# Patient Record
Sex: FEMALE | Race: WHITE | NOT HISPANIC OR LATINO | Employment: FULL TIME | URBAN - METROPOLITAN AREA
[De-identification: names, ages, dates, MRNs, and addresses within clinical notes are randomized per-mention and may not be internally consistent; named-entity substitution may affect disease eponyms.]

---

## 2023-10-31 ENCOUNTER — TELEMEDICINE (OUTPATIENT)
Dept: ENDOCRINOLOGY | Facility: CLINIC | Age: 38
End: 2023-10-31
Payer: COMMERCIAL

## 2023-10-31 ENCOUNTER — DOCUMENTATION (OUTPATIENT)
Dept: ENDOCRINOLOGY | Facility: CLINIC | Age: 38
End: 2023-10-31

## 2023-10-31 DIAGNOSIS — Z01.83 ENCOUNTER FOR RH BLOOD TYPING: ICD-10-CM

## 2023-10-31 DIAGNOSIS — N97.9 FEMALE INFERTILITY: ICD-10-CM

## 2023-10-31 DIAGNOSIS — Z31.41 FERTILITY TESTING: ICD-10-CM

## 2023-10-31 DIAGNOSIS — Z01.812 ENCOUNTER FOR PREPROCEDURAL LABORATORY EXAMINATION: ICD-10-CM

## 2023-10-31 DIAGNOSIS — Z11.3 SCREENING FOR STDS (SEXUALLY TRANSMITTED DISEASES): ICD-10-CM

## 2023-10-31 DIAGNOSIS — Z11.59 ENCOUNTER FOR SCREENING FOR OTHER VIRAL DISEASES: ICD-10-CM

## 2023-10-31 DIAGNOSIS — Z13.29 SCREENING FOR THYROID DISORDER: Primary | ICD-10-CM

## 2023-10-31 DIAGNOSIS — N96 RECURRENT PREGNANCY LOSS WITHOUT CURRENT PREGNANCY: ICD-10-CM

## 2023-10-31 PROCEDURE — 99213 OFFICE O/P EST LOW 20 MIN: CPT | Performed by: NURSE PRACTITIONER

## 2023-10-31 ASSESSMENT — PATIENT HEALTH QUESTIONNAIRE - PHQ9
SUM OF ALL RESPONSES TO PHQ9 QUESTIONS 1 AND 2: 0
2. FEELING DOWN, DEPRESSED OR HOPELESS: NOT AT ALL
1. LITTLE INTEREST OR PLEASURE IN DOING THINGS: NOT AT ALL

## 2023-10-31 ASSESSMENT — ENCOUNTER SYMPTOMS
LOSS OF SENSATION IN FEET: 0
DEPRESSION: 0
OCCASIONAL FEELINGS OF UNSTEADINESS: 0

## 2023-10-31 ASSESSMENT — PAIN SCALES - GENERAL: PAINLEVEL: 0-NO PAIN

## 2023-10-31 NOTE — PROGRESS NOTES
Treatment to date reviewed:    IVF #1: 2021: OCP Antagonist /Menopur 75--> Peak E2 1911--> Lupron trigger, 16 eggs, 7 mature--> ICSI--. 3 blasts--> 1 Day 5 4bb Transferred--> negative  2 blasts frozen (Day 5 3BBx2)    FET #1: 2021- Programmed FET with oral estrace and vaginal added x 2 days, EE 9.1 TL--> 1 blast transferred, hCG=9 (biochemical)    Mock : 2021- Programmed FET with oral estrace and Vaginal estrace (from beginning)--> 8.1 TL, 75 mg IM P4--> Biopsy  Receptive  BCL6+  Small number  cells, treated with doxy      Did endo suppression with Lupron, Letrozole, Aygestin  FET #3: 2021- Programmed FET with oral estrace and vaginal estrace, EE 8.4 mm TL--> 1 blast--> negative      IVF #2: 2021- OCP Antagonist /Menopur 75--> Peak E2 --> Co-trigger, 14 eggs retrieved, 12 mature-> ICSI-->10 2pn--. 5 blasts frozen (4 Day 5- 4AA, 4ABx2, 3AB, 1 day 6 3BB)    RPL labs done-  +LAC x1    FET #4: 2021 Letrozole FET 5 mg CD3-7--> HCG trigger, EE 7.2 TL--> Prometrium 200 mg vaginally BID--> increased to TID  Lovenox +baby ASA--> Successful SIUP with full term  and GHTN    Plan for next cycle-  Repeat APLAS testing  Plan FET with letrozole 5 mg CD3-7 with hCG trigger and Prometrium vaginally 200 mg TID +Lovenox and Baby ASA to start with transfer. Transfer 1 blast.    If no success with this protocol can consider suppression therapy in the future transfers if we want to try programmed FET again.    Shante Lozano MD

## 2023-10-31 NOTE — PROGRESS NOTES
Virtual Visit     FERTILITY PATIENT VISIT    Accompanied today by: patient      Lilly Arshad is a 37 y.o.  female who presents with wanting to do an FET.     Has 4 embryos here.    Had 3 failed transfers. She then did BCL6 testing and . Positive for both. Also was receptive.   Did suppression protocol with Lupron, aygestin and letrozole.    PRIOR EVALUATION / TREATMENT  Previously did IVF.    3 Failed transfers (1- biochemical) and 4th had a pregnancy.    Conceived with a stimulated FET with letrozole 5mg and vaginal progesterone suppositories. Lovenox 40mg subcutaneous daily and baby aspirin started with transfer due to elevated LAC.    She also did a suppression protocol prior, but did have a month off to re-stimulate.     Hysterosalpingogram: N/A  Saline Infused Sonography: N/A  GYN Pelvic Ultrasound: N/A  Other:  none    Prior Labs  Lab Results    Date Done      AMH: No results found for requested labs within last 365 days. No results found for requested labs within last 365 days.   TSH: No results found for requested labs within last 365 days. No results found for requested labs within last 365 days.   PRL: No results found for requested labs within last 365 days. No results found for requested labs within last 365 days.   Testosterone: No results found for requested labs within last 365 days. No results found for requested labs within last 365 days.   DHEAS: No results found for requested labs within last 365 days. No results found for requested labs within last 365 days.   FSH: No results found for requested labs within last 365 days. No results found for requested labs within last 365 days.   17 OHP: No results found for requested labs within last 365 days. No results found for requested labs within last 365 days.   HgbA1c: No results found for requested labs within last 365 days. No results found for requested labs within last 365 days.   Hepatitis B surface antigen: No results found for  requested labs within last 365 days. No results found for requested labs within last 365 days.   Hepatitis C antibody: No results found for requested labs within last 365 days. No results found for requested labs within last 365 days.   HIV ½ Antigen Antibody screen with reflex: No results found for requested labs within last 365 days. No results found for requested labs within last 365 days.   Syphilis screening with reflex: No results found for requested labs within last 365 days. No results found for requested labs within last 365 days.   GC: No results found for requested labs within last 365 days. No results found for requested labs within last 365 days.   CT: No results found for requested labs within last 365 days. No results found for requested labs within last 365 days.   Type and Screen: No results found for requested labs within last 365 days. No results found for requested labs within last 365 days.   Rh: No results found for requested labs within last 365 days. No results found for requested labs within last 365 days.   Antibody: No results found for requested labs within last 365 days. No results found for requested labs within last 365 days.   Rubella: No results found for requested labs within last 365 days. 1/26/2022   Varicella: No results found for requested labs within last 365 days. 7/1/2019   Hemoglobin: No results found for requested labs within last 365 days. No results found for requested labs within last 365 days.   Hematocrit: No results found for requested labs within last 365 days. No results found for requested labs within last 365 days.   Creatinine: No results found for requested labs within last 365 days. No results found for requested labs within last 365 days.   AST:No results found for requested labs within last 365 days. No results found for requested labs within last 365 days.   ALT:No results found for requested labs within last 365 days.: No results found for requested labs  within last 365 days.      Relationship Status:      OB Hx     OB History          1    Para   1    Term   1       0    AB   0    Living   1         SAB   0    IAB   0    Ectopic   0    Multiple   0    Live Births   1                 MENSTRUAL HISTORY:   Cycle length:  Q 28-30 days  Bleeding length: 5 days   Flow :  Average    Dysmenorrhea: No  She has stopped breastfeeding.  Pap - normal       PMH  Past Medical History:   Diagnosis Date    Encounter for pregnancy test, result unknown 05/10/2021    Pregnancy examination or test, pregnancy unconfirmed    Inappropriate change in quantitative human chorionic gonadotropin (hCG) in early pregnancy 05/10/2021    Inappropriate change in quantitative hCG in early pregnancy    Other specified health status 2019    No pertinent past medical history        MEDICATIONS  No current outpatient medications on file prior to visit.     No current facility-administered medications on file prior to visit.       PS  Past Surgical History:   Procedure Laterality Date    ANTERIOR CRUCIATE LIGAMENT REPAIR  2015    Primary Repair Of Knee Ligament Cruciate Anterior    OTHER SURGICAL HISTORY  2022    In vitro fertilization        Social History     Tobacco Use    Smoking status: Former     Types: Cigarettes    Smokeless tobacco: Never   Substance Use Topics    Alcohol use: Yes    Drug use: Never        BMI:   BMI Readings from Last 1 Encounters:   22 24.61 kg/m²     VITALS:  LMP 10/08/2023   LMP: Patient's last menstrual period was 10/08/2023.    ASSESSMENT   37 y.o.  female with  secondary unexplained infertility x 1 year. Previously did IVF with us and has 4 frozen blast here. Wants to do an FET.    COUNSELING  We discussed causes of infertility including hormonal, egg quality issues, structural problems such as endometriosis, adhesions, or tubal problems, uterine factors such as polyps or fibroids, and sperm issues. Reviewed  evaluation of such as well. We discussed various methods for achieving pregnancy in some detail including, ovulation induction, insemination, superovulation and IVF.    FET NP Teaching Performed  Reviewed plan for upcoming FET.   Reviewed lining check. Reviewed estrogen priming and progesterone.   Reviewed need for ultrasound monitoring. Reviewed cancelled cycles, sometimes multiple to achieve optimal lining.   Reviewed possibility that embryos might not survive thaw and need for signed thaw plan.   All questions answered.   Reviewed risks for stimulated FET's to be cancelled due to falling on the weekend.  Plan for Letrozole 5mg stimulated FET with vaginal progesterone. Conceived on this. Will also plan on Lovenox 40mg subcutaneous and baby aspirin 81mg po with transfer.    [X ] Hysteroscopy vs SIS for cavity evaluation  Labs: STD's, T&S, Rubella, Varicella, TSH, LAC  We have agree to transfer: 1 embryos    Kenya Oconnell LAUREN        Routine Testing  Fertility Center  STDs Within 1 year   Genetic carrier Waiver/Completed   T&S Within 1 year   AMH Within 1 year   TSH Within 1 year   Rubella/Varicella Within 5 years     BMI Testing  Fertility Center  CBC Within 1 year   CMP Within 1 year   HgbA1c Within 1 year   Mag, Phos, Vit D <18 Within 1 year   MFM > 40  REQ   Wt loss consult > 40 OPT     PLAN  Orders Placed This Encounter   Procedures    Hysteroscopy diagnostic    TSH with reflex to Free T4 if abnormal    Type And Screen    Rubella Antibody, Igg    Varicella Zoster Antibody, Igg    Hepatitis B surface antigen    Hepatitis C Antibody    HIV-1 and HIV-2 antibodies    Syphilis Screen with Reflex    C. Trachomatis / N. Gonorrhoeae, Amplified Detection    Lupus Anticoag. With Interpretation[Painter]    POCT pregnancy, urine manually resulted       FOLLOW UP   Hysteroscopy- call cd 1 to schedule between cycle day 6-11.  Labs: can be done at any  lab.  Will touch base with Dr. Lozano about another suppression protocol  prior to transfer. Previously did Lupron, Aygestin and Letrozole. Will message patient back on that.  Will send a message to RN after hearing back to get on the stimulated FET calendar.    Kenya Oconnell  10/31/2023  10:20 AM

## 2023-11-14 ENCOUNTER — HOSPITAL ENCOUNTER (OUTPATIENT)
Dept: ENDOCRINOLOGY | Facility: CLINIC | Age: 38
Discharge: HOME | End: 2023-11-14
Payer: COMMERCIAL

## 2023-11-14 ENCOUNTER — LAB (OUTPATIENT)
Dept: LAB | Facility: LAB | Age: 38
End: 2023-11-14
Payer: COMMERCIAL

## 2023-11-14 ENCOUNTER — PREP FOR PROCEDURE (OUTPATIENT)
Dept: ENDOCRINOLOGY | Facility: CLINIC | Age: 38
End: 2023-11-14

## 2023-11-14 VITALS
BODY MASS INDEX: 26.75 KG/M2 | WEIGHT: 166.45 LBS | RESPIRATION RATE: 17 BRPM | HEIGHT: 66 IN | DIASTOLIC BLOOD PRESSURE: 85 MMHG | OXYGEN SATURATION: 98 % | HEART RATE: 72 BPM | SYSTOLIC BLOOD PRESSURE: 130 MMHG | TEMPERATURE: 97.9 F

## 2023-11-14 DIAGNOSIS — N84.0 ENDOMETRIAL POLYP: Primary | ICD-10-CM

## 2023-11-14 DIAGNOSIS — Z11.3 SCREENING FOR STDS (SEXUALLY TRANSMITTED DISEASES): ICD-10-CM

## 2023-11-14 DIAGNOSIS — Z11.59 ENCOUNTER FOR SCREENING FOR OTHER VIRAL DISEASES: ICD-10-CM

## 2023-11-14 DIAGNOSIS — Z13.29 SCREENING FOR THYROID DISORDER: ICD-10-CM

## 2023-11-14 DIAGNOSIS — Z01.812 ENCOUNTER FOR PREPROCEDURAL LABORATORY EXAMINATION: ICD-10-CM

## 2023-11-14 DIAGNOSIS — N96 RECURRENT PREGNANCY LOSS WITHOUT CURRENT PREGNANCY: ICD-10-CM

## 2023-11-14 DIAGNOSIS — Z31.41 FERTILITY TESTING: ICD-10-CM

## 2023-11-14 DIAGNOSIS — Z01.83 ENCOUNTER FOR RH BLOOD TYPING: ICD-10-CM

## 2023-11-14 LAB
ABO GROUP (TYPE) IN BLOOD: NORMAL
ANTIBODY SCREEN: NORMAL
HBV SURFACE AG SERPL QL IA: NONREACTIVE
PREGNANCY TEST URINE, POC: NEGATIVE
RH FACTOR (ANTIGEN D): NORMAL
TSH SERPL-ACNC: 1.37 MIU/L (ref 0.44–3.98)

## 2023-11-14 PROCEDURE — 87340 HEPATITIS B SURFACE AG IA: CPT

## 2023-11-14 PROCEDURE — 86780 TREPONEMA PALLIDUM: CPT

## 2023-11-14 PROCEDURE — 86900 BLOOD TYPING SEROLOGIC ABO: CPT

## 2023-11-14 PROCEDURE — 94760 N-INVAS EAR/PLS OXIMETRY 1: CPT

## 2023-11-14 PROCEDURE — 86787 VARICELLA-ZOSTER ANTIBODY: CPT

## 2023-11-14 PROCEDURE — 36415 COLL VENOUS BLD VENIPUNCTURE: CPT

## 2023-11-14 PROCEDURE — 86901 BLOOD TYPING SEROLOGIC RH(D): CPT

## 2023-11-14 PROCEDURE — 86803 HEPATITIS C AB TEST: CPT

## 2023-11-14 PROCEDURE — 86850 RBC ANTIBODY SCREEN: CPT

## 2023-11-14 PROCEDURE — 87800 DETECT AGNT MULT DNA DIREC: CPT

## 2023-11-14 PROCEDURE — 58555 HYSTEROSCOPY DX SEP PROC: CPT | Performed by: OBSTETRICS & GYNECOLOGY

## 2023-11-14 PROCEDURE — 86317 IMMUNOASSAY INFECTIOUS AGENT: CPT

## 2023-11-14 PROCEDURE — 87389 HIV-1 AG W/HIV-1&-2 AB AG IA: CPT

## 2023-11-14 PROCEDURE — 84443 ASSAY THYROID STIM HORMONE: CPT

## 2023-11-14 RX ORDER — KETOROLAC TROMETHAMINE 30 MG/ML
30 INJECTION, SOLUTION INTRAMUSCULAR; INTRAVENOUS ONCE AS NEEDED
Status: CANCELLED | OUTPATIENT
Start: 2023-11-14 | End: 2023-11-19

## 2023-11-14 RX ORDER — ACETAMINOPHEN 325 MG/1
650 TABLET ORAL ONCE AS NEEDED
Status: DISCONTINUED | OUTPATIENT
Start: 2023-11-14 | End: 2023-11-15 | Stop reason: HOSPADM

## 2023-11-14 RX ORDER — KETOROLAC TROMETHAMINE 30 MG/ML
30 INJECTION, SOLUTION INTRAMUSCULAR; INTRAVENOUS ONCE AS NEEDED
Status: DISCONTINUED | OUTPATIENT
Start: 2023-11-14 | End: 2023-11-15 | Stop reason: HOSPADM

## 2023-11-14 RX ORDER — ACETAMINOPHEN 325 MG/1
650 TABLET ORAL ONCE AS NEEDED
Status: CANCELLED | OUTPATIENT
Start: 2023-11-14

## 2023-11-14 ASSESSMENT — PAIN - FUNCTIONAL ASSESSMENT
PAIN_FUNCTIONAL_ASSESSMENT: 0-10
PAIN_FUNCTIONAL_ASSESSMENT: 0-10

## 2023-11-14 ASSESSMENT — PAIN SCALES - GENERAL: PAINLEVEL_OUTOF10: 0 - NO PAIN

## 2023-11-14 NOTE — PROGRESS NOTES
Patient ID: Lilly Arshad is a 37 y.o. female.    Hysteroscopy diagnostic    Date/Time: 11/14/2023 2:57 PM    Performed by: Shante Lozano MD  Authorized by: JARET Stubbs    Consent:     Consent obtained:  Verbal and written    Consent given by:  Patient    Risks, benefits, and alternatives were discussed: yes      Risks discussed:  Bleeding, infection and pain  Universal protocol:     Procedure explained and questions answered to patient or proxy's satisfaction: yes      Relevant documents present and verified: yes      Test results available: yes      Imaging studies available: yes      Required blood products, implants, devices, and special equipment available: yes      Immediately prior to procedure, a time out was called: yes      Patient identity confirmed:  Verbally with patient, arm band and hospital-assigned identification number  Pre-procedure details:     Skin preparation:  Povidone-iodine  Anesthesia:     Anesthesia method:  Local infiltration    Local anesthetic:  Lidocaine 1% w/o epi  Procedure specific details:      Procedure: Diagnostic Hysteroscopy   Preop diagnosis: Fertility testing  Post op diagnosis: Endometrial polyp   Assistant: None    Anesthesia: None   IV: None   EBL: 3 cc  Specimens: None   Complications: None   Risks benefits and alternatives of the procedure explained to the patient and informed consent was obtained. Urine pregnancy test was performed and was negative. Time out was performed. The patient was placed in the dorsal lithotomy position and a sterile speculum was placed in the vagina. The cervix was sterilized with Betadine x3. Paracervical block with lidocaine 1% was administered.   Tenaculum: No  Dilation: No  The hysteroscope was placed in the cervix and advanced into the uterine cavity. Normal saline was used for distension media. Images were obtained and findings noted as below.   All instruments were then removed. Good hemostasis was noted. Patient  tolerated the procedure well returned to the recovery area in stable condition. .   Findings:   Cavity: Small endometrial polyp noted on right posterior wall  Ostia: Bilateral tubal ostia visualized  Additional Notes: Cavity otherwise normal  Will recommend operative hysteroscopy/polypectomy            Post-procedure details:     Procedure completion:  Tolerated well, no immediate complications

## 2023-11-15 ENCOUNTER — APPOINTMENT (OUTPATIENT)
Dept: ENDOCRINOLOGY | Facility: CLINIC | Age: 38
End: 2023-11-15
Payer: COMMERCIAL

## 2023-11-15 ENCOUNTER — TELEPHONE (OUTPATIENT)
Dept: ENDOCRINOLOGY | Facility: CLINIC | Age: 38
End: 2023-11-15
Payer: COMMERCIAL

## 2023-11-15 DIAGNOSIS — Z31.83 ENCOUNTER FOR ASSISTED REPRODUCTIVE FERTILITY CYCLE: ICD-10-CM

## 2023-11-15 LAB
C TRACH RRNA SPEC QL NAA+PROBE: NEGATIVE
HCV AB SER QL: NONREACTIVE
HIV 1+2 AB+HIV1 P24 AG SERPL QL IA: NONREACTIVE
N GONORRHOEA DNA SPEC QL PROBE+SIG AMP: NEGATIVE
RUBV IGG SERPL IA-ACNC: 0.8 IA
RUBV IGG SERPL QL IA: NORMAL
T PALLIDUM AB SER QL: NONREACTIVE
VARICELLA ZOSTER IGG INDEX: 0.9 IA
VZV IGG SER QL IA: ABNORMAL

## 2023-11-15 NOTE — TELEPHONE ENCOUNTER
Talked with patient and confirmed her hysteroscopic polypectomy without anesthesia on Friday 11/17/23 at 1430. Patient asked to arrive at 1415. Patient agreeable and all questions answered. Trisha Cunningham RN 11/15/23 3:21 PM

## 2023-11-16 ENCOUNTER — TELEPHONE (OUTPATIENT)
Dept: ENDOCRINOLOGY | Facility: CLINIC | Age: 38
End: 2023-11-16
Payer: COMMERCIAL

## 2023-11-16 DIAGNOSIS — N97.9 FEMALE INFERTILITY: ICD-10-CM

## 2023-11-16 DIAGNOSIS — N96 RECURRENT PREGNANCY LOSS WITHOUT CURRENT PREGNANCY: Primary | ICD-10-CM

## 2023-11-16 DIAGNOSIS — Z31.83 ENCOUNTER FOR ASSISTED REPRODUCTIVE FERTILITY CYCLE: ICD-10-CM

## 2023-11-16 NOTE — TELEPHONE ENCOUNTER
"Lab services called and said the lupus anti coagulant was cancelled. Reason stated was \"improperly processed.\" Labs were drawn 11/14.  "

## 2023-11-16 NOTE — PROGRESS NOTES
Bo[facility] Interval FET Checklist    Age: 38 y.o.    Provider: LAUREN Melendez MD  Primary RN: Kathia Moeller  Reasons for Treatment: secondary unexplained infertility x 1 year. Previously did IVF with us and has 4 frozen blast here. Wants to do an FET   Last BMI  23 : 26.87 kg/m²       Past Medical History:   Diagnosis Date    Encounter for pregnancy test, result unknown 05/10/2021    Pregnancy examination or test, pregnancy unconfirmed    Endometriosis     Inappropriate change in quantitative human chorionic gonadotropin (hCG) in early pregnancy 05/10/2021    Inappropriate change in quantitative hCG in early pregnancy    Other specified health status 2019    No pertinent past medical history     Ectopic Risk: N    Date Done Consultation Results/Comments   10/31/23 Medication Protocol Plan for next cycle-  Plan FET with letrozole 5 mg CD3-7 with hCG trigger and Prometrium vaginally 200 mg TID. Transfer 1 blast.  No lovenox needed based on recent LAC results which were WNL- RF.    If no success with this protocol can consider suppression therapy in the future transfers if we want to try programmed FET again.    24 FET Treatment Form Enroll in EngagedMD: Yes (Kathia Moeller RN)  Received and in chart: Yes (Kathia Moeller RN)   24 IVF Consent Form Enroll in EngagedMD: Yes (Kathia Moelelr RN)  Received and in chart: Yes (Kathia Moeller RN)   N/A  Amina (in) Form Enroll in EngagedMD: N/A  Received and in chart: N/A   N/A [employer] Transfer Authorization Form Enroll in EngagedMD: N/A  Received and in chart: N/A   N/A Embryo Ship In N/A   23 Procedure Order Placed Yes    N/A Genetic Carrier Screening Clearance N/A   N/A MFM Consult Okay to proceed? N/A   N/A Psych Consult Okay to proceed? N/A   N/A Genetics Consult Okay to proceed? N/A    Other    Date Done Female Labs Results/Comments   2023 T&S (Q 1 Year) ABO: A  Rh: NEG  Antibody:     11/14/2023 Hep B sAg Nonreactive   11/14/2023 Hep C AB Nonreactive   11/14/2023 HIV Nonreactive   11/14/2023 Syphilis Nonreactive   11/14/2023 GC/CT GC: Negative  CT: Negative   11/14/2023 Rubella (Q 5 Years) Equivocal- second vaccine on 1/11/24 11/14/2023 Varicella (Q 5 Years) Equivocal- second vaccine on 12/15/23   11/14/2023 TSH 1.37 (Ref range: 0.44 - 3.98 mIU/L)   N/A HgbA1C N/A   11/14/23 11/17/23 Uterine Cavity Eval HSG: N/A  SIS: N/A  Hyster: (11/14/2023) Procedure: Diagnostic Hysteroscopy   Preop diagnosis: Fertility testing  Post op diagnosis: Endometrial polyp   Assistant: None    Anesthesia: None   IV: None   EBL: 3 cc  Specimens: None   Complications: None   Risks benefits and alternatives of the procedure explained to the patient and informed consent was obtained. Urine pregnancy test was performed and was negative. Time out was performed. The patient was placed in the dorsal lithotomy position and a sterile speculum was placed in the vagina. The cervix was sterilized with Betadine x3. Paracervical block with lidocaine 1% was administered.   Tenaculum: No  Dilation: No  The hysteroscope was placed in the cervix and advanced into the uterine cavity. Normal saline was used for distension media. Images were obtained and findings noted as below.   All instruments were then removed. Good hemostasis was noted. Patient tolerated the procedure well returned to the recovery area in stable condition. .   Findings:   Cavity: Small endometrial polyp noted on right posterior wall  Ostia: Bilateral tubal ostia visualized  Additional Notes: Cavity otherwise normal  Will recommend operative hysteroscopy/polypectomy          Findings: endometrial polyp vs very small fibroid noted on posterior wall and removed.  Cavity otherwise normal with ostia visualized bilaterally.      PROCEDURE DETAILS:   Patient was taken to the operating  room.  She was prepped and draped in the usual sterile fashion in dorsal lithotomy position. A speculum was placed within the vaginal canal and the cervix was visualized. Betadine swabs x 3 were used to cleanse the cervix. A single tooth tenaculum was placed on the anterior lip of the cervix. Lidocaine 1% was injected paracervically (8 ml). The Aveta hysteroscope was then inserted into the uterine cavity under direct visualization using normal saline as a distension medium. Once entry into the uterine cavity was completed, the above findings were visualized. The resection device was used to the resect the tissue until the uterus appeared smooth with no residual pathology.   Additional procedure details: None     Excellent hemostasis was then noted. The hysteroscope was then removed from the uterine cavity without complications. Tenaculum and speculum were removed. Patient tolerated the procedure well and was returned to the recovery room in stable condition.  All counts were correct x 2.    1/26/2022 Pap Smear (Q 5 Years) Lab Results   Component Value Date    CVTFINALDX  01/26/2022     A.  THINPREP PAP CERVICAL:              Specimen adequacy:       SATISFACTORY FOR EVALUATION.       Quality Indicator: Endocervical/transformation zone component is present.       Quality Indicator: Partially obscured by cytolysis.              General Categorization:       NEGATIVE FOR INTRAEPITHELIAL LESION OR MALIGNANCY.                            HIGH RISK HPV TEST RESULT:          NEGATIVE              Reference Range: Negative                     HPV: Negative    N/A Mammogram ( > 40) (Q 1 Year) N/A               Date Done Miscellaneous Results/Comments   N/A BMI Checklist  BMI > 40 or < 18 Added to chart:   No   N/A >= 45 Checklist  Added to chart:   No   **Does not need to be completed prior to placing on IVF calendar**    No lovenox needed based on recent LAC results.   Reviewed and approved by ROSANNA RANDHAWA on 2/12/24 at  4:02 PM.

## 2023-11-16 NOTE — TELEPHONE ENCOUNTER
Spoke with patient regarding lupus anti coagulant level. Patient instructed d/t level being improperly processed by the lab patient will need to have level redrawn. Patient isntructed she may go to a  lab close to home to have level drawn. Patient instructed to call this RN after she has second varicella booster to ensure checklist items have been completed. Patient instructed that this RN will enroll patient in engaged MD and assign required forms. Patient instructed this RN will message financial team regarding PA for [facility]. Patient agreeable. Patient will call after second booster for next steps.   GABBY EUCEDA on 11/16/23 at 10:21 AM.

## 2023-11-17 ENCOUNTER — LAB (OUTPATIENT)
Dept: LAB | Facility: LAB | Age: 38
End: 2023-11-17
Payer: COMMERCIAL

## 2023-11-17 ENCOUNTER — HOSPITAL ENCOUNTER (OUTPATIENT)
Dept: ENDOCRINOLOGY | Facility: CLINIC | Age: 38
Discharge: HOME | End: 2023-11-17
Payer: COMMERCIAL

## 2023-11-17 VITALS
RESPIRATION RATE: 16 BRPM | DIASTOLIC BLOOD PRESSURE: 89 MMHG | TEMPERATURE: 97.3 F | WEIGHT: 165.79 LBS | HEART RATE: 70 BPM | SYSTOLIC BLOOD PRESSURE: 132 MMHG | BODY MASS INDEX: 26.64 KG/M2 | HEIGHT: 66 IN | OXYGEN SATURATION: 98 %

## 2023-11-17 DIAGNOSIS — N96 RECURRENT PREGNANCY LOSS WITHOUT CURRENT PREGNANCY: ICD-10-CM

## 2023-11-17 DIAGNOSIS — N84.0 ENDOMETRIAL POLYP: ICD-10-CM

## 2023-11-17 LAB — PREGNANCY TEST URINE, POC: NEGATIVE

## 2023-11-17 PROCEDURE — 58558 HYSTEROSCOPY BIOPSY: CPT | Performed by: OBSTETRICS & GYNECOLOGY

## 2023-11-17 PROCEDURE — 36415 COLL VENOUS BLD VENIPUNCTURE: CPT

## 2023-11-17 PROCEDURE — 85730 THROMBOPLASTIN TIME PARTIAL: CPT

## 2023-11-17 PROCEDURE — 88305 TISSUE EXAM BY PATHOLOGIST: CPT

## 2023-11-17 PROCEDURE — 88305 TISSUE EXAM BY PATHOLOGIST: CPT | Mod: TC,SUR | Performed by: STUDENT IN AN ORGANIZED HEALTH CARE EDUCATION/TRAINING PROGRAM

## 2023-11-17 PROCEDURE — 85613 RUSSELL VIPER VENOM DILUTED: CPT

## 2023-11-17 PROCEDURE — 88305 TISSUE EXAM BY PATHOLOGIST: CPT | Performed by: STUDENT IN AN ORGANIZED HEALTH CARE EDUCATION/TRAINING PROGRAM

## 2023-11-17 RX ORDER — LIDOCAINE HYDROCHLORIDE 10 MG/ML
10 INJECTION, SOLUTION EPIDURAL; INFILTRATION; INTRACAUDAL; PERINEURAL AS NEEDED
Status: DISCONTINUED | OUTPATIENT
Start: 2023-11-17 | End: 2023-11-18 | Stop reason: HOSPADM

## 2023-11-17 ASSESSMENT — PAIN - FUNCTIONAL ASSESSMENT: PAIN_FUNCTIONAL_ASSESSMENT: 0-10

## 2023-11-17 ASSESSMENT — PAIN SCALES - GENERAL: PAINLEVEL_OUTOF10: 0 - NO PAIN

## 2023-11-17 NOTE — PROGRESS NOTES
Patient ID: Lilly Arshad is a 38 y.o. female.    Polypectomy    Date/Time: 11/17/2023 3:23 PM    Performed by: Shante Lozano MD  Authorized by: Shante Lozano MD    Consent:     Consent obtained:  Written and verbal    Consent given by:  Patient    Risks, benefits, and alternatives were discussed: yes      Risks discussed:  Bleeding, infection and pain    Alternatives discussed:  No treatment  Universal protocol:     Procedure explained and questions answered to patient or proxy's satisfaction: yes      Relevant documents present and verified: yes      Test results available: yes      Imaging studies available: yes      Immediately prior to procedure, a time out was called: yes      Patient identity confirmed:  Verbally with patient and arm band  Pre-procedure details:     Skin preparation:  Povidone-iodine    Preparation: Patient was prepped and draped in the usual sterile fashion    Sedation:     Sedation type:  None  Anesthesia:     Anesthesia method:  Local infiltration    Local anesthetic:  Lidocaine 1% w/o epi  Procedure specific details:      Preoperative Diagnosis: endometrial polyp  Postoperative Diagnosis: Same vs small fibroid  Assistant: none       Procedure: Hysteroscopic polypectomy    Discussed risks, benefits, alternatives and potential complications of surgical management. Written Informed Consent was obtained prior to the procedure and is detailed in the patient's record. Urine pregnancy test was performed and was negative. Time out was performed.      Paracervical block with 1% lidocaine: Yes 10 cc  Cervical dilation: Yes  Estimated Blood Loss: 5 cc  Specimens: endometrial polyp  IV Fluids: none cc   Hysteroscopic fluid deficit: 50 cc     Findings: endometrial polyp vs very small fibroid noted on posterior wall and removed.  Cavity otherwise normal with ostia visualized bilaterally.      PROCEDURE DETAILS:   Patient was taken to the operating room.  She was prepped and draped in  the usual sterile fashion in dorsal lithotomy position. A speculum was placed within the vaginal canal and the cervix was visualized. Betadine swabs x 3 were used to cleanse the cervix. A single tooth tenaculum was placed on the anterior lip of the cervix. Lidocaine 1% was injected paracervically (8 ml). The Aveta hysteroscope was then inserted into the uterine cavity under direct visualization using normal saline as a distension medium. Once entry into the uterine cavity was completed, the above findings were visualized. The resection device was used to the resect the tissue until the uterus appeared smooth with no residual pathology.   Additional procedure details: None    Excellent hemostasis was then noted. The hysteroscope was then removed from the uterine cavity without complications. Tenaculum and speculum were removed. Patient tolerated the procedure well and was returned to the recovery room in stable condition.  All counts were correct x 2.         Post-procedure details:     Procedure completion:  Tolerated well, no immediate complications

## 2023-11-17 NOTE — DISCHARGE INSTRUCTIONS
[facility]  [address]. [place]  [address]  Tel: [phone]   Fax: [phone]    Home Going Instructions after Polypectomy:    Activity:   We do not recommend any exercise on the day of your polypectomy.  You may return to work the following day.  You may have light bleeding or spotting for several days after polypectomy.   Use only sanitary pads for bleeding, do not use tampons until you have no further bleeding.   Please abstain from intercourse until you have no further bleeding.     Anesthesia:  Drink small amounts of liquids initially and then slowly increase your intake of food. Drinking fluids will keep your bowels regular.   Avoid foods that are sweet, spicy or hard to digest today.  If you feel nauseated, rest your stomach for one hour, and then try drinking clear liquids again.  You may take a stool softener or miralax/milk of magnesia to help with constipation that may occur after anesthesia.  Please make sure a responsible adult is with you for at least 24 hours after surgery and do not drive or make important decisions during this time. Anesthesia may affect your judgment, coordination, and reaction time.    Pain:  If you experience any post-op pain, pain can be managed by taking Ibuprofen and/or Tylenol.    Follow up:  Follow up with your primary nurse a few days after your procedure to check in and make sure you know what your next steps are.   A post-operative visit with your physician is not necessary after polypectomy.    When to call your provider:  Vaginal bleeding that is more than a normal period that doesn't taper down.  Bleeding through 1 sanitary pad an hour.  Any clots the size of an egg or bigger.  Fever of 100.4 or higher with chills.  Unusual or foul smelling discharge.  Worsening abdominal pain.    Barbara Chacon    2:29 PM

## 2023-11-17 NOTE — PROGRESS NOTES
IVF Procedure Area H&P    Ms. Lilly Arshad is a 38 y.o. F who presents for hysteroscopic polypectomy    Interval history reviewed and affirmed as up to date.      MedHx:   Past Medical History:   Diagnosis Date    Encounter for pregnancy test, result unknown 05/10/2021    Pregnancy examination or test, pregnancy unconfirmed    Endometriosis     Inappropriate change in quantitative human chorionic gonadotropin (hCG) in early pregnancy 05/10/2021    Inappropriate change in quantitative hCG in early pregnancy    Other specified health status 07/01/2019    No pertinent past medical history       SurgHx:   Past Surgical History:   Procedure Laterality Date    ANTERIOR CRUCIATE LIGAMENT REPAIR  08/03/2015    Primary Repair Of Knee Ligament Cruciate Anterior    OTHER SURGICAL HISTORY  01/26/2022    In vitro fertilization       Meds:   No current outpatient medications on file prior to encounter.     No current facility-administered medications on file prior to encounter.       Allergies: No Known Allergies    ROS: negative apart from what is indicated above    PE:   Gen: AA x 3   Resp: No labored breathing  Abdomen: soft, non-tender, non-distended    A/P: Ms. Lilly Arshad is a 38 y.o. F who presents for above procedure under anesthesia in the IVF procedure area. Okay to proceed with above stated procedure.    Shante Lozano

## 2023-11-20 LAB
DRVVT SCREEN TO CONFIRM RATIO: 0.91 RATIO
DRVVT/DRVVT CFM NRMLZD PPP-RTO: 0.95 RATIO
DRVVT/DRVVT CFM P DOAC NEUT NORM PPP-RTO: 0.96 RATIO
LA 2 SCREEN W REFLEX-IMP: NORMAL
NORMALIZED SCT PPP-RTO: 1.03 RATIO
SILICA CLOTTING TIME CONFIRMATION: 0.94 RATIO
SILICA CLOTTING TIME SCREEN: 0.96 RATIO

## 2023-12-05 LAB
LABORATORY COMMENT REPORT: NORMAL
PATH REPORT.FINAL DX SPEC: NORMAL
PATH REPORT.GROSS SPEC: NORMAL
PATH REPORT.RELEVANT HX SPEC: NORMAL
PATH REPORT.TOTAL CANCER: NORMAL

## 2023-12-06 ENCOUNTER — TELEPHONE (OUTPATIENT)
Dept: POSTOP/PACU | Facility: HOSPITAL | Age: 38
End: 2023-12-06
Payer: COMMERCIAL

## 2023-12-06 NOTE — TELEPHONE ENCOUNTER
Telephone Encounter    Called patient to review results:    FINAL DIAGNOSIS   A. Endometrium, curettings:  -- Small fragments of proliferative pattern endometrium  -- Fragments of histologically unremarkable smooth muscle     VM left.    Milagros Arceo MD PGY 5  Reproductive Endocrinology and Infertility Fellow

## 2024-01-05 ENCOUNTER — DOCUMENTATION (OUTPATIENT)
Dept: ENDOCRINOLOGY | Facility: CLINIC | Age: 39
End: 2024-01-05
Payer: COMMERCIAL

## 2024-01-05 ENCOUNTER — TELEPHONE (OUTPATIENT)
Dept: ENDOCRINOLOGY | Facility: CLINIC | Age: 39
End: 2024-01-05
Payer: COMMERCIAL

## 2024-01-05 NOTE — TELEPHONE ENCOUNTER
Returned patient call regarding upcoming FET cycle. Patient states she had second Varicella vaccine on 12/15/23. Patient instructed treatment must wait until 30 days post second booster. Patient agreeable. Patient instructed to complete forms sent via engaged MD. Patient instructed leadership team was contacted to contact patient regarding [employer] and new IVF consent. Patient given embryology lab number to contact lab regarding specific questions. Patient instructed as soon as forms are complete this RN will have BP signed off by a provider. Patient instructed this RN sent staff message to Kenya Oconnell NP to see if patient can start an OCP with January cycle so that patient does not have to wait for February cycle. Patient instructed to call with January menses. Patient agreeable.

## 2024-01-05 NOTE — PROGRESS NOTES
Phone call to patient to discuss change to off-site storage model. Reviewed that  will no longer offer long-term cryostorage for reproductive tissues and that the patient will need to select transfer their genetic material to a third party storage facility of their choosing or discard tissues. Discussed with patient our relationship with [employer] and the process for and coordination of shipments with [employer]. Patient is is aware that they can choose any offsite facility for long-term storage of reproductive tissues. If they choose another vendor, the patient is aware that they will be responsible for the coordination of their contract and shipments. Reviewed with patient terms of 2019 IVF Authorization form that patient signed.  Patient spoke with embryology lab and had her questions answered as well.  All patient questions answered to their satisfaction.  IVF Authorization and [employer] Couple Form sent to patient via [employer].   CORY AGUILAR on 1/5/24 at 3:55 PM.

## 2024-01-08 ENCOUNTER — TELEPHONE (OUTPATIENT)
Dept: ENDOCRINOLOGY | Facility: CLINIC | Age: 39
End: 2024-01-08
Payer: COMMERCIAL

## 2024-01-08 NOTE — TELEPHONE ENCOUNTER
----- Message from Kenya WINKLER JARET Oconnell sent at 1/8/2024  2:16 PM EST -----  Regarding: RE: Stimulated FET  Yes that is totally fine. She is probably fine to just start with this cycle since her meds won't actually start until close to 30 days after the injection and retrieval will def be after that date.    Kenya WINKLER JARET Oconnell    ----- Message -----  From: Gabby Moeller RN  Sent: 1/5/2024  11:44 AM EST  To: Kenya WINKLER JARET Oconnell  Subject: Stimulated FET                                   Hi Lilly Zambrano is setting up for a stimulated FET. She said today that she expects her menses any day now. Her boarding pass is complete I'm just waiting for leadership to contact her regarding [employer] so that she can complete her forms. She had her chicken pox second booster on 12/15/23. Would she be able to start an OCP with her January cycle so that she does not have to wait for her February cycle? She states she has been on OCPs in the past.     Thank you,   GABBY MOELLER on 1/5/24 at 11:44 AM.

## 2024-01-10 DIAGNOSIS — N97.9 FEMALE INFERTILITY: ICD-10-CM

## 2024-01-10 NOTE — PROGRESS NOTES
Phone call placed to patient in response to my chart message received. Patient instructed that if she were to not get Rubella vaccine that she would start Letrozole with this cycle CD 3-7 with a lining check on 1/19/24 CD 10. Patient instructed if trigger and/or transfer would fall on a weekend we would have to cancel cycle. Patient stated she would like to get Rubella vaccine. Patient instructed that she can call with February cycle as transfer would then fall 30 days post vaccine. Patient expects her February menses on the 10th. Patient tentatively added to IVF start calendar for stimulated FET for that menses. Patient instructed to send my chart message when she receives vaccine. Patient instructed to call with February menses. Patient instructed this RN will send FET medications to local pharmacy and [email or web address] as patient will be OOT February 10th- 14th. Patient agreeable.   GABBY EUCEDA on 1/10/24 at 4:39 PM.

## 2024-01-11 ENCOUNTER — SPECIALTY PHARMACY (OUTPATIENT)
Dept: PHARMACY | Facility: CLINIC | Age: 39
End: 2024-01-11

## 2024-01-11 ENCOUNTER — DOCUMENTATION (OUTPATIENT)
Dept: PHARMACY | Facility: CLINIC | Age: 39
End: 2024-01-11

## 2024-01-11 PROCEDURE — RXMED WILLOW AMBULATORY MEDICATION CHARGE

## 2024-01-11 RX ORDER — ENOXAPARIN SODIUM 100 MG/ML
40 INJECTION SUBCUTANEOUS DAILY
Qty: 30 EACH | Refills: 2 | Status: SHIPPED | OUTPATIENT
Start: 2024-01-11 | End: 2024-04-10

## 2024-01-11 RX ORDER — CHORIONIC GONADOTROPIN 10000 UNIT
KIT INTRAMUSCULAR
Qty: 1 EACH | Refills: 0 | Status: SHIPPED | OUTPATIENT
Start: 2024-01-11 | End: 2024-04-18 | Stop reason: WASHOUT

## 2024-01-11 RX ORDER — LETROZOLE 2.5 MG/1
5 TABLET, FILM COATED ORAL DAILY
Qty: 10 TABLET | Refills: 0 | Status: SHIPPED | OUTPATIENT
Start: 2024-01-11 | End: 2024-04-10

## 2024-01-11 RX ORDER — PROGESTERONE 200 MG/1
200 CAPSULE ORAL 3 TIMES DAILY
Qty: 90 CAPSULE | Refills: 2 | Status: SHIPPED | OUTPATIENT
Start: 2024-01-11 | End: 2024-04-10

## 2024-01-18 ENCOUNTER — SPECIALTY PHARMACY (OUTPATIENT)
Dept: PHARMACY | Facility: CLINIC | Age: 39
End: 2024-01-18

## 2024-02-07 ENCOUNTER — PHARMACY VISIT (OUTPATIENT)
Dept: PHARMACY | Facility: CLINIC | Age: 39
End: 2024-02-07
Payer: COMMERCIAL

## 2024-02-09 ENCOUNTER — TELEPHONE (OUTPATIENT)
Dept: ENDOCRINOLOGY | Facility: CLINIC | Age: 39
End: 2024-02-09
Payer: COMMERCIAL

## 2024-02-09 DIAGNOSIS — N97.9 FEMALE INFERTILITY: ICD-10-CM

## 2024-02-09 NOTE — TELEPHONE ENCOUNTER
Returned patient's call, CD 1 is today. Reviewed with patient to start her Letrozole 5mg on 2/11/24 after confirming a -UPT. Will call to schedule monitor appointment for CD 12, 2/20/24.   Patient verbalizes understanding at this time.   Added to Noon Huddle for 2/12/24. Needs BP signed off.  GRAZYNA MENDOZA on 2/9/24 at 4:38 PM.

## 2024-02-12 NOTE — PROGRESS NOTES
Patient called with menses for FET setup.   LMP: 2/9/24  Plan: FET with letrozole 5 mg CD3-7 with hCG trigger and Prometrium vaginally 200 mg TID. Transfer 1 blast.   Tentative lining check: 2/20/24 CD 12   Tentative progesterone start: 4 days after trigger   Tentative transfer date: TBD   Number of days of progesterone for transfer: 4  Treatment plan: Signed on 1/4/24  Embryo # confirmed: 4 blasts  Embryo # to transfer: 1 - RF  Gabby Moeller   Reviewed and approved by ROSANNA RANDHAWA on 2/12/24 at 2:23 PM.    Attempted to reach patient with above plan. Detailed voicemail left for patient. Patient instructed front office will call her tomorrow to schedule a lining check for 2/20/24. This RN sent message to  for what patient will be scheduling for.   GABBY MOELLER on 2/12/24 at 3:46 PM.

## 2024-02-20 ENCOUNTER — ANCILLARY PROCEDURE (OUTPATIENT)
Dept: ENDOCRINOLOGY | Facility: CLINIC | Age: 39
End: 2024-02-20
Payer: COMMERCIAL

## 2024-02-20 DIAGNOSIS — N97.9 FEMALE INFERTILITY: ICD-10-CM

## 2024-02-20 LAB
ESTRADIOL SERPL-MCNC: 198 PG/ML
LH SERPL-ACNC: 4.3 IU/L
PROGEST SERPL-MCNC: 0.5 NG/ML

## 2024-02-20 PROCEDURE — 83002 ASSAY OF GONADOTROPIN (LH): CPT

## 2024-02-20 PROCEDURE — 36415 COLL VENOUS BLD VENIPUNCTURE: CPT

## 2024-02-20 PROCEDURE — 76857 US EXAM PELVIC LIMITED: CPT

## 2024-02-20 PROCEDURE — 84144 ASSAY OF PROGESTERONE: CPT

## 2024-02-20 PROCEDURE — 82670 ASSAY OF TOTAL ESTRADIOL: CPT

## 2024-02-20 RX ORDER — CHORIONIC GONADOTROPIN 10000 UNIT
10000 KIT INTRAMUSCULAR ONCE AS NEEDED
Qty: 1 EACH | Refills: 0
Start: 2024-02-20 | End: 2024-04-18 | Stop reason: WASHOUT

## 2024-02-20 NOTE — PROGRESS NOTES
CYCLING NOTE    Here for US and/or lab monitoring; relevant findings reviewed. Patient is 38 years old. Patient of Dr. Lozano. Patient is here for FET #5 Lining check. Patient is CD 12. Protocol- Letrozole 5 mg CD3-7 with hCG trigger and Prometrium vaginally 200 mg TID +Lovenox and Baby ASA to start with transfer. Progesterone to start 4 days after trigger. Patient states she has all medications.     Patient stayed for nurse visit. Pain is 0/10  Team will contact patient later today with results and plan.    Kathia Moeller  02/20/2024  8:19 AM    [facility]  Ultrasound and/or labs reviewed at [facility].   Results for orders placed or performed in visit on 02/20/24 (from the past 96 hour(s))   Estradiol   Result Value Ref Range    Estradiol 198 pg/mL   Progesterone   Result Value Ref Range    Progesterone 0.5 ng/mL   Luteinizing Hormone (LH)   Result Value Ref Range    Luteinizing Hormone 4.3 IU/L     Tue 2/20 (Day 12)   chorionic gonadotropin injection: Inject 10,000 Units 1 time if needed under the skin    Sat 2/24 (Day 16)   progesterone capsule: Insert 200 mg 3 times a day into the vagina    Sun 2/25 (Day 17)   progesterone capsule: Insert 200 mg 3 times a day into the vagina    Mon 2/26 (Day 18)   progesterone capsule: Insert 200 mg 3 times a day into the vagina    Tue 2/27 (Day 19)   progesterone capsule: Insert 200 mg 3 times a day into the vagina        RTC in ONE WEEK for  Embryo transfer  and Progesterone.   Shante Lozano  02/20/2024  1:29 PM    Spoke with patient regarding above plan as well as sent my chart message to patient with plan. Patient instructed to trigger this evening with HCG trigger. Trigger instructions reviewed with patient and patient sent medication instruction sheet as well as medication QR code sheet. Patient instructed to start Progesterone 200 mg TID PV on Saturday 2/24/24. Patient instructed to RTC on 2/27/24 for embryo transfer. Patient instructed to start lovenox  and baby aspirin day of transfer. Patient instructed a member from the lab will call the day before her transfer to schedule a time. Patient agreeable. Patient denies further questions or concerns at this time.   GABBY EUCEDA on 2/20/24 at 3:05 PM.

## 2024-02-26 ENCOUNTER — PREP FOR PROCEDURE (OUTPATIENT)
Dept: ENDOCRINOLOGY | Facility: CLINIC | Age: 39
End: 2024-02-26
Payer: COMMERCIAL

## 2024-02-26 RX ORDER — ACETAMINOPHEN 325 MG/1
650 TABLET ORAL ONCE AS NEEDED
Status: CANCELLED | OUTPATIENT
Start: 2024-02-26

## 2024-02-27 ENCOUNTER — HOSPITAL ENCOUNTER (OUTPATIENT)
Dept: ENDOCRINOLOGY | Facility: CLINIC | Age: 39
Discharge: HOME | End: 2024-02-27
Payer: COMMERCIAL

## 2024-02-27 DIAGNOSIS — N97.9 FEMALE INFERTILITY: ICD-10-CM

## 2024-02-27 DIAGNOSIS — Z32.00 PREGNANCY EXAMINATION OR TEST, PREGNANCY UNCONFIRMED: Primary | ICD-10-CM

## 2024-02-27 DIAGNOSIS — Z31.83 ENCOUNTER FOR ASSISTED REPRODUCTIVE FERTILITY CYCLE: ICD-10-CM

## 2024-02-27 LAB — PROGEST SERPL-MCNC: 19.2 NG/ML

## 2024-02-27 PROCEDURE — 36415 COLL VENOUS BLD VENIPUNCTURE: CPT | Performed by: OBSTETRICS & GYNECOLOGY

## 2024-02-27 PROCEDURE — 58974 EMBRYO TRANSFER INTRAUTERINE: CPT | Performed by: OBSTETRICS & GYNECOLOGY

## 2024-02-27 PROCEDURE — 84144 ASSAY OF PROGESTERONE: CPT | Performed by: OBSTETRICS & GYNECOLOGY

## 2024-02-27 PROCEDURE — 84144 ASSAY OF PROGESTERONE: CPT

## 2024-02-27 PROCEDURE — 36415 COLL VENOUS BLD VENIPUNCTURE: CPT

## 2024-02-27 RX ORDER — ACETAMINOPHEN 325 MG/1
650 TABLET ORAL ONCE AS NEEDED
Status: DISCONTINUED | OUTPATIENT
Start: 2024-02-27 | End: 2024-02-28 | Stop reason: HOSPADM

## 2024-02-27 NOTE — PROGRESS NOTES
Patient ID: Lilly Arshad is a 38 y.o. female.    Embryo Transfer    Date/Time: 2/27/2024 12:28 PM    Performed by: Shante Lozano MD  Authorized by: Shante Lozano MD    Consent:     Consent obtained:  Written    Consent given by:  Patient    Procedure risks and benefits discussed: yes      Patient questions answered: yes      Patient agrees, verbalizes understanding, and wants to proceed: yes      Educational handouts given: yes      Instructions and paperwork completed: yes    Procedure:     Pelvic exam performed: No      Cervix cleaned and prepped: Yes      Speculum placed in vagina: Yes      Ultrasound guidance: Yes      Catheter type:  Sure View Rhodes    Difficulty: easy      Estimated blood loss:  None    Embryos transferred:  1    Catheter navigation notes:  Easy afterload  Post-procedure:     Patient tolerated procedure well: yes    Comments:      Preop diagnosis: Infertility  Post op diagnosis: Same  Assistant: None  Depth: - cm  Curve: 30 deg  Distance from fundus: 15 mm  Embryo stage at day of transfer: day 5  Embryo notes: blastocyst   Additional Notes:  Anesthesia: None    IV Fluids: None  UOP: Not recorded  Specimens: None  Complications: None  This replaces an operative report.

## 2024-02-27 NOTE — DISCHARGE INSTRUCTIONS
[facility]  [address]. [place]  [address]  Tel: [phone]   Fax: [phone]    Home Going Instructions after Embryo Transfer:     After completing your embryo transfer please treat your body as though you are pregnant.  No smoking, alcohol, or recreational drugs.  Make sure you are taking a prenatal vitamin daily.   Continue all medications currently prescribed for embryo transfer until otherwise instructed by your physician, even if you experience spotting or bleeding and even if you have a negative home pregnancy test.   Medications to avoid in pregnancy: Advil, Motrin, Ibuprofen, Aleve, Excedrin, Dorene-Horton, Sudafed, and Pepto-Bismol. Avoid aspirin unless you are taking this daily at the direction of your physician.   Medications that are generally safe in pregnancy: Tylenol, Benadryl, Plain Robitussin, Zyrtec, Claritin, Pepcid, Tums, Rolaids, Mylanta, Nasonex.   Foods to avoid:   Seafood that is high in mercury; you can have canned tuna twice a week.   Deli meats, deli salads, hot dogs, and unpasteurized cheeses due to the risk of Listeria.  Activities to avoid:   No hot tubs, whirlpools, or saunas.  Avoid starting new exercise routines that you have not done previously.  Avoid lifting > 30 lbs.  No cleaning litter boxes.  No intercourse until you test for pregnancy.   You do not need to be on bed rest following the transfer. It is healthy, normal, and recommended to go about routine physical activity.   We advise against taking a home pregnancy test due to the possibility of false negative and false positive results.   You will test for pregnancy in approximately 10 days, at which point you will be about 4 weeks pregnant.   If blood work was drawn on the day of your transfer, you can expect a phone that day with the results of your bloodwork. We expect a progesterone level greater than or equal to 16. If you level is less than 16  we will adjust your progesterone dose and repeat your level in 2 days.     Hoa Strickland    11:17 AM

## 2024-03-08 ENCOUNTER — LAB (OUTPATIENT)
Dept: LAB | Facility: LAB | Age: 39
End: 2024-03-08
Payer: COMMERCIAL

## 2024-03-08 DIAGNOSIS — Z32.01 POSITIVE BLOOD PREGNANCY TEST (HHS-HCC): Primary | ICD-10-CM

## 2024-03-08 DIAGNOSIS — Z32.00 PREGNANCY EXAMINATION OR TEST, PREGNANCY UNCONFIRMED: ICD-10-CM

## 2024-03-08 LAB — B-HCG SERPL-ACNC: 139 MIU/ML

## 2024-03-08 PROCEDURE — 36415 COLL VENOUS BLD VENIPUNCTURE: CPT

## 2024-03-08 PROCEDURE — 84702 CHORIONIC GONADOTROPIN TEST: CPT

## 2024-03-08 NOTE — PROGRESS NOTES
Initial HC  Component      Latest Ref Rng 3/8/2024   HCG, Beta-Quantitative      <5 mIU/mL 139 (H)      Patient's CHRISTOPHER Provider: Kenya Oconnell, LAUREN Lozano MD  Infertility dx: Unexplained Infertility  Achieved pregnancy by: Embryo transfer  Fertility medications used this cycle: letrozole  LMP: Patient's last menstrual period was 2024  EGA based on (IUI date, ET ): embryo transfer date 24; 4w1d    Updated G/P with this pregnancy:   OB HX:  OB History    Para Term  AB Living   1 1 1 0 0 1   SAB IAB Ectopic Multiple Live Births   0 0 0 0 1      # Outcome Date GA Lbr Segundo/2nd Weight Sex Delivery Anes PTL Lv   1 Term 22 40w0d   F Vag-Spont EPI N ADA      Birth Comments: hx gestational hypertension       Type & Screen: ABO: A, Rh: NEG  History of miscarriage: No  History of pelvic/abdominal surgeries: No  History of STDs/PID: No  Hx of ectopic: No  Hx of tubal disease/ blockage: No    Risk for ectopic: No      Current medications:     Current Outpatient Medications:     chorionic gonadotropin (Pregnyl) 10,000 unit injection, Reconstitute according to instructions and inject 10,000 units (1 mL) under the skin as a one time dose, as directed per provider for trigger., Disp: 1 each, Rfl: 0    chorionic gonadotropin (Pregnyl) 10,000 unit injection, Inject 10,000 Units under the skin 1 time if needed (N/A) for up to 1 dose. Reconstitute according to instructions and inject 10,000 units (1 mL) under the skin as a one time dose, as directed per provider for trigger., Disp: 1 each, Rfl: 0    enoxaparin (Lovenox) 40 mg/0.4 mL syringe, Inject 0.4 mL (40 mg) under the skin once daily. Inject under the skin as directed by provider, Disp: 30 each, Rfl: 2    letrozole (Femara) 2.5 mg tablet, Take 2 tablets (5 mg total) by mouth once daily.  Take 2 tablets by mouth cycle days 3-7 after negative urine pregnancy test every cycle before starting letrozole., Disp: 10 tablet, Rfl: 0     progesterone (Prometrium) 200 mg capsule, Insert 1 capsule (200 mg) into the vagina 3 times a day., Disp: 90 capsule, Rfl: 2    PNV: Yes  Vitamin D 2000 IUs: Yes  Luteal support: Prometrium 200mg PV three times daily  Additional medications: Lovenox + baby ASA    Labs ordered/Plan:   BhCG ordered. Team will reach out to patient with results and plan.   Discussed early pregnancy versus miscarriage versus ectopic. Precautions given.   Patient expresses understanding of plan and is agreeable.    Shannan Akhtar  03/08/2024  8:26 AM    [facility] - [facility] RE[facility]  Ultrasound and/or labs reviewed at [facility].     Results for orders placed or performed in visit on 03/08/24 (from the past 96 hour(s))   hCG, quantitative, pregnancy   Result Value Ref Range    HCG, Beta-Quantitative 139 (H) <5 mIU/mL       Lab Results   Component Value Date    HCGQUANT 139 (H) 03/08/2024    HCGQUANT 8,983 (A) 12/13/2021    HCGQUANT 75 (A) 10/10/2021    HCGQUANT <2 08/09/2021    HCGQUANT <2 03/30/2021    HCGQUANT 9 (A) 03/26/2021    HCGQUANT <2 02/22/2021       RTC in ONE WEEK for LABS ONLY VISIT and HCG.   Patricia Ying  03/08/2024  1:27 PM      Telephone call made to patient to discuss MD plan above. Patient aware of lab results. Patient agreeable to plan. Patient states she will obtain bHcg lab work at outside  laboratory next Friday 3/15. RN messaged  to add patient to noon huddle for next Friday 3/15.    03/08/24 at 3:47 PM - Shannan Akhtar RN

## 2024-03-15 ENCOUNTER — LAB (OUTPATIENT)
Dept: LAB | Facility: LAB | Age: 39
End: 2024-03-15
Payer: COMMERCIAL

## 2024-03-15 DIAGNOSIS — Z32.01 POSITIVE BLOOD PREGNANCY TEST (HHS-HCC): ICD-10-CM

## 2024-03-15 DIAGNOSIS — O36.80X0 ENCOUNTER TO DETERMINE FETAL VIABILITY OF PREGNANCY, NOT APPLICABLE OR UNSPECIFIED FETUS (HHS-HCC): ICD-10-CM

## 2024-03-15 LAB — B-HCG SERPL-ACNC: 1787 MIU/ML

## 2024-03-15 PROCEDURE — 84702 CHORIONIC GONADOTROPIN TEST: CPT

## 2024-03-15 PROCEDURE — 36415 COLL VENOUS BLD VENIPUNCTURE: CPT

## 2024-03-15 NOTE — PROGRESS NOTES
Initial HC  Component      Latest Ref Rng 3/8/2024 3/15/2024   HCG, Beta-Quantitative      <5 mIU/mL 139 (H)  1,787 (H)    Patient's CHRISTOPHER Provider: Kenya Oconnell, LAUREN Lozano MD  Infertility dx: Unexplained Infertility  Achieved pregnancy by: Embryo transfer  Fertility medications used this cycle: letrozole  LMP: Patient's last menstrual period was 2024  EGA based on (IUI date, ET ): embryo transfer date 24; 5w1d     Updated G/P with this pregnancy:   OB HX:                   OB History    Para Term  AB Living   1 1 1 0 0 1   SAB IAB Ectopic Multiple Live Births      0 0 0 0 1          # Outcome Date GA Lbr Segundo/2nd Weight Sex Delivery Anes PTL Lv   1 Term 22 40w0d     F Vag-Spont EPI N ADA      Birth Comments: hx gestational hypertension         Type & Screen: ABO: A, Rh: NEG  History of miscarriage: No  History of pelvic/abdominal surgeries: No  History of STDs/PID: No  Hx of ectopic: No  Hx of tubal disease/ blockage: No     Risk for ectopic: No        Current medications:      Current Outpatient Medications:     chorionic gonadotropin (Pregnyl) 10,000 unit injection, Reconstitute according to instructions and inject 10,000 units (1 mL) under the skin as a one time dose, as directed per provider for trigger., Disp: 1 each, Rfl: 0    chorionic gonadotropin (Pregnyl) 10,000 unit injection, Inject 10,000 Units under the skin 1 time if needed (N/A) for up to 1 dose. Reconstitute according to instructions and inject 10,000 units (1 mL) under the skin as a one time dose, as directed per provider for trigger., Disp: 1 each, Rfl: 0    enoxaparin (Lovenox) 40 mg/0.4 mL syringe, Inject 0.4 mL (40 mg) under the skin once daily. Inject under the skin as directed by provider, Disp: 30 each, Rfl: 2    letrozole (Femara) 2.5 mg tablet, Take 2 tablets (5 mg total) by mouth once daily.  Take 2 tablets by mouth cycle days 3-7 after negative urine pregnancy test every cycle before  starting letrozole., Disp: 10 tablet, Rfl: 0    progesterone (Prometrium) 200 mg capsule, Insert 1 capsule (200 mg) into the vagina 3 times a day., Disp: 90 capsule, Rfl: 2     PNV: Yes  Vitamin D 2000 IUs: Yes  Luteal support: Prometrium 200mg PV three times daily  Additional medications: Lovenox + baby ASA       03/15/24 at 9:24 AM - Shannan Akhtar RN     [facility] PROVIDER NOTE - HCG REVIEW  Ultrasound and/or labs reviewed at [facility]       Lab Results   Component Value Date    HCGQUANT 1,787 (H) 03/15/2024    HCGQUANT 139 (H) 03/08/2024    HCGQUANT 8,983 (A) 12/13/2021    HCGQUANT 75 (A) 10/10/2021    HCGQUANT <2 08/09/2021    HCGQUANT <2 03/30/2021    HCGQUANT 9 (A) 03/26/2021    HCGQUANT <2 02/22/2021       RTC in AT APPROX 6 weeks 5 DAYS GESTATION for OB scan   Milagros Arceo  03/15/2024  1:24 PM    Telephone call made to patient to discuss results and plan. This RN instructed patient to continue medications as she has been. RN discussed with patient early OB scan. Plan for patient to schedule for early OB scan on Thursday 3/28. Patient states she may have jury duty during that week and will not know until Friday 3/22. RN to schedule patient for 3/28 scan, and patient will let us know when she is aware of her jury duty schedule if it will interfere. All questions answered. Patient transferred to  to schedule scan.    03/15/24 at 1:36 PM - Shannan Akhtar RN

## 2024-03-28 ENCOUNTER — ANCILLARY PROCEDURE (OUTPATIENT)
Dept: ENDOCRINOLOGY | Facility: CLINIC | Age: 39
End: 2024-03-28
Payer: COMMERCIAL

## 2024-03-28 ENCOUNTER — OFFICE VISIT (OUTPATIENT)
Dept: ENDOCRINOLOGY | Facility: CLINIC | Age: 39
End: 2024-03-28
Payer: COMMERCIAL

## 2024-03-28 DIAGNOSIS — O36.80X0 ENCOUNTER TO DETERMINE FETAL VIABILITY OF PREGNANCY, SINGLE OR UNSPECIFIED FETUS (HHS-HCC): Primary | ICD-10-CM

## 2024-03-28 DIAGNOSIS — O36.80X0 ENCOUNTER TO DETERMINE FETAL VIABILITY OF PREGNANCY, NOT APPLICABLE OR UNSPECIFIED FETUS (HHS-HCC): ICD-10-CM

## 2024-03-28 PROCEDURE — 99212 OFFICE O/P EST SF 10 MIN: CPT

## 2024-03-28 PROCEDURE — 76817 TRANSVAGINAL US OBSTETRIC: CPT

## 2024-03-28 PROCEDURE — 99212 OFFICE O/P EST SF 10 MIN: CPT | Mod: 25

## 2024-03-28 NOTE — Clinical Note
Hi Dr. Lozano, we graduated Lilly: 7 wks, Let/HCG trigger/FET: will see Dr. Crowley, she does have MARY KATE- no bleeding, precautions given & will continue Baby ASA & Lovenox for now, Bettina

## 2024-03-28 NOTE — PROGRESS NOTES
Visit Type: In Person    OB Scan    Ectopic risk factor: no  PGTA/PGTM: no  Blood type: A negative  Meds: PNV daily, Prometrium 200 mg PV TID, Lovenox 40 mg subcutaneous daily, & Baby ASA 81 mg daily    Reviewed results from OB ultrasound today and results reviewed with pt as follows:     OB Scan results:   Conception with Letrozole/HCG Trigger/ 5 day FET on 2/27/2024: 7 w + 0 d, RIGOBERTO 11/14/2024  Stated RIGOBERTO 7 w + 0 d, RIGOBERTO 11/14/2024  U/S 3/28/2024 based upon CRL 6 w + 6 d, RIGOBERTO 11/15/2024  Assessment Gestational sac: visualized. Location: intrauterine  Yolk sac: visualized  Embryo: visualized  Cardiac activity: present  Early placenta: circumferential  YS 3.2 mm   CRL 7.3 mm 6w 6d    bpm  MARY KATE measures: 20.1/6.4/13.8 mm  Size = dates    Detailed discussion with patient about her scan results, pregnancy, and next steps:    Plan:   Reviewed medications in detail. She will continue PNV, Prometrium, Lovenox, & Baby ASA.  Reviewed supplemental progesterone, route and dose, reviewed dates of cessation. Last day for Prometrium is on 4-.  Discussed with patient any pregnancy concerns and discussed establishing care with an  OB. Patient will schedule a new OB visit with Dr. Crowley.   Will provide recommendations if she desires.   Advised to call with bleeding, pain or concerns. Patient denies any pain, bleeding, or cramping.   Discussed MARY KATE- she will call with any bleeding. Pelvic rest for now. Continue with Lovenox & Baby ASA for now per Dr. Lozano.     Ultrasound results and Plan of care reviewed with MD Bettina Barrientos, CNP 03/28/24 6:35 PM

## 2024-04-18 ENCOUNTER — PATIENT MESSAGE (OUTPATIENT)
Dept: OBSTETRICS AND GYNECOLOGY | Facility: CLINIC | Age: 39
End: 2024-04-18

## 2024-04-18 ENCOUNTER — INITIAL PRENATAL (OUTPATIENT)
Dept: OBSTETRICS AND GYNECOLOGY | Facility: CLINIC | Age: 39
End: 2024-04-18
Payer: COMMERCIAL

## 2024-04-18 VITALS
DIASTOLIC BLOOD PRESSURE: 60 MMHG | TEMPERATURE: 98.3 F | BODY MASS INDEX: 26.65 KG/M2 | SYSTOLIC BLOOD PRESSURE: 118 MMHG | WEIGHT: 165.8 LBS | HEART RATE: 61 BPM | HEIGHT: 66 IN

## 2024-04-18 DIAGNOSIS — O09.811 PREGNANCY RESULTING FROM IN VITRO FERTILIZATION IN FIRST TRIMESTER (HHS-HCC): ICD-10-CM

## 2024-04-18 DIAGNOSIS — Z87.59 HISTORY OF GESTATIONAL HYPERTENSION: ICD-10-CM

## 2024-04-18 DIAGNOSIS — Z3A.09 9 WEEKS GESTATION OF PREGNANCY (HHS-HCC): ICD-10-CM

## 2024-04-18 DIAGNOSIS — Z34.91 INITIAL OBSTETRIC VISIT IN FIRST TRIMESTER (HHS-HCC): Primary | ICD-10-CM

## 2024-04-18 DIAGNOSIS — Z34.90 EARLY STAGE OF PREGNANCY (HHS-HCC): ICD-10-CM

## 2024-04-18 PROCEDURE — 0500F INITIAL PRENATAL CARE VISIT: CPT | Performed by: NURSE PRACTITIONER

## 2024-04-18 RX ORDER — ENOXAPARIN SODIUM 100 MG/ML
40 INJECTION SUBCUTANEOUS DAILY
COMMUNITY
Start: 2021-10-22

## 2024-04-18 RX ORDER — PNV NO.95/FERROUS FUM/FOLIC AC 28MG-0.8MG
1 TABLET ORAL DAILY
COMMUNITY

## 2024-04-18 ASSESSMENT — SOCIAL DETERMINANTS OF HEALTH (SDOH)
WITHIN THE LAST YEAR, HAVE YOU BEEN HUMILIATED OR EMOTIONALLY ABUSED IN OTHER WAYS BY YOUR PARTNER OR EX-PARTNER?: NO
WITHIN THE LAST YEAR, HAVE YOU BEEN KICKED, HIT, SLAPPED, OR OTHERWISE PHYSICALLY HURT BY YOUR PARTNER OR EX-PARTNER?: NO
WITHIN THE LAST YEAR, HAVE YOU BEEN AFRAID OF YOUR PARTNER OR EX-PARTNER?: NO
WITHIN THE LAST YEAR, HAVE TO BEEN RAPED OR FORCED TO HAVE ANY KIND OF SEXUAL ACTIVITY BY YOUR PARTNER OR EX-PARTNER?: NO

## 2024-04-18 ASSESSMENT — PAIN SCALES - GENERAL: PAINLEVEL: 0-NO PAIN

## 2024-04-18 NOTE — PROGRESS NOTES
Subjective   Patient ID [id number]   Lilly Arshad is a 38 y.o.  at 9w6d with a working estimated date of delivery of 11/15/2024, by Embryo Transfer who presents for an initial prenatal visit. This pregnancy is planned.  IVF embryo transfer on 24 with RIGOBERTO 11/15/24  Last pap: 2022 pap and HPV neg    Her pregnancy is complicated by:  History first pregnancy with gHTN developed late term pregnancy- on baby ASA and progesterone, and subcutaneous lovenox  Denies h/o GDM      OB History    Para Term  AB Living   2 1 1 0 0 1   SAB IAB Ectopic Multiple Live Births   0 0 0 0 1      # Outcome Date GA Lbr Segundo/2nd Weight Sex Delivery Anes PTL Lv   2 Current            1 Term 22 40w0d   F Vag-Spont EPI N ADA      Birth Comments: hx gestational hypertension          Objective   Physical Exam  Weight: 75.2 kg (165 lb 12.8 oz)  Expected Total Weight Gain: Could not be calculated   Pregravid BMI: Could not be calculated  BP: 118/60          OBGyn Exam    Prenatal Labs  Ordred, including baseline preeclampsia labs    Assessment/Plan   Problem List Items Addressed This Visit    None  Visit Diagnoses         Codes    Initial obstetric visit in first trimester ([facility])    -  Primary Z34.91    Relevant Orders    US PELVIS OB TRANSABDOMINAL W TRANSVAGINAL UP TO 1ST TRIMESTER    C. Trachomatis / N. Gonorrhoeae, Amplified Detection    CBC Anemia Panel With Reflex,Pregnancy    Hemoglobin Identification with Path Review    Hepatitis B surface Ag    Hepatitis C Antibody    HIV 1/2 Antigen/Antibody Screen with Reflex to Confirmation    Rubella IgG    Syphilis Screen with Reflex    Type And Screen    Urine culture    Comprehensive Metabolic Panel    Protein, Urine Random (P:C Ratio)    Early stage of pregnancy ([facility])     Z34.90    Relevant Orders    US PELVIS OB TRANSABDOMINAL W TRANSVAGINAL UP TO 1ST TRIMESTER    9 weeks gestation of pregnancy ([facility])     Z3A.09    History of gestational hypertension      Z87.59    Pregnancy resulting from in vitro fertilization in first trimester ([facility])     O09.811          External doppler unable to detect FHR today, will check transvaginal US, scheduled.  Prenatal Labs ordered  Daily prenatal vitamins prescribed, start daily 2 baby ASA discussed. Will continue progesterone and lovenox until 11w  First trimester screening and second trimester screening discussed. Patient decided to complete [employer] cfDNA testing, discussed R/B/A and pt desires gender testing. Will complete next week. First check US ordered.  Follow up in 4 weeks for return OB visit.

## 2024-04-19 ENCOUNTER — HOSPITAL ENCOUNTER (OUTPATIENT)
Dept: RADIOLOGY | Facility: HOSPITAL | Age: 39
Discharge: HOME | End: 2024-04-19
Payer: COMMERCIAL

## 2024-04-19 DIAGNOSIS — Z34.91 INITIAL OBSTETRIC VISIT IN FIRST TRIMESTER (HHS-HCC): ICD-10-CM

## 2024-04-19 DIAGNOSIS — Z34.90 EARLY STAGE OF PREGNANCY (HHS-HCC): ICD-10-CM

## 2024-04-19 PROCEDURE — 76801 OB US < 14 WKS SINGLE FETUS: CPT

## 2024-04-19 PROCEDURE — 76817 TRANSVAGINAL US OBSTETRIC: CPT | Performed by: RADIOLOGY

## 2024-04-19 PROCEDURE — 76801 OB US < 14 WKS SINGLE FETUS: CPT | Performed by: RADIOLOGY

## 2024-04-23 ENCOUNTER — TELEPHONE (OUTPATIENT)
Dept: OBSTETRICS AND GYNECOLOGY | Facility: CLINIC | Age: 39
End: 2024-04-23

## 2024-04-23 DIAGNOSIS — O09.811 PREGNANCY RESULTING FROM IN VITRO FERTILIZATION IN FIRST TRIMESTER (HHS-HCC): ICD-10-CM

## 2024-04-23 DIAGNOSIS — O09.521 AMA (ADVANCED MATERNAL AGE) MULTIGRAVIDA 35+, FIRST TRIMESTER (HHS-HCC): Primary | ICD-10-CM

## 2024-04-23 NOTE — TELEPHONE ENCOUNTER
Patient left voicemail to clarify regarding labs and phone number for scheduling her ultrasound. Return call made, voicemail left to inform to go to any  lab, blood work orders are in the system. I left [phone] as the number for the closest site for scheduling ultrasound.    Looks like Prequel was never ordered even though it was discussed during the visit.     Please review and order as appropriate.

## 2024-04-23 NOTE — TELEPHONE ENCOUNTER
Unable to reach pt by phone. LM on VM she may  box/papers for blood test if she desires. Orders are placed for cfDNA My[facility] testing. Pt to return call. Charis Ojeda, APRN-CNP

## 2024-04-23 NOTE — TELEPHONE ENCOUNTER
Spoke to pt via phone. Discussed her US was normal, 10w viable intrauterine pregnancy. Will plan for NT ultrasound in 2-3 weeks and does want rr cfDNA. Testing ordered, pt to stop in office to get supplies tomorrow. Charis Ojeda, CHAPARRO-CNP

## 2024-04-24 ENCOUNTER — LAB (OUTPATIENT)
Dept: LAB | Facility: LAB | Age: 39
End: 2024-04-24
Payer: COMMERCIAL

## 2024-04-24 DIAGNOSIS — Z34.91 INITIAL OBSTETRIC VISIT IN FIRST TRIMESTER (HHS-HCC): ICD-10-CM

## 2024-04-24 LAB
ABO GROUP (TYPE) IN BLOOD: NORMAL
ALBUMIN SERPL BCP-MCNC: 4.3 G/DL (ref 3.4–5)
ALP SERPL-CCNC: 33 U/L (ref 33–110)
ALT SERPL W P-5'-P-CCNC: 10 U/L (ref 7–45)
ANION GAP SERPL CALC-SCNC: 11 MMOL/L (ref 10–20)
ANTIBODY SCREEN: NORMAL
AST SERPL W P-5'-P-CCNC: 11 U/L (ref 9–39)
BILIRUB SERPL-MCNC: 0.5 MG/DL (ref 0–1.2)
BUN SERPL-MCNC: 6 MG/DL (ref 6–23)
CALCIUM SERPL-MCNC: 9 MG/DL (ref 8.6–10.3)
CHLORIDE SERPL-SCNC: 106 MMOL/L (ref 98–107)
CO2 SERPL-SCNC: 24 MMOL/L (ref 21–32)
CREAT SERPL-MCNC: 0.56 MG/DL (ref 0.5–1.05)
EGFRCR SERPLBLD CKD-EPI 2021: >90 ML/MIN/1.73M*2
ERYTHROCYTE [DISTWIDTH] IN BLOOD BY AUTOMATED COUNT: 12.4 % (ref 11.5–14.5)
GLUCOSE SERPL-MCNC: 87 MG/DL (ref 74–99)
HBV SURFACE AG SERPL QL IA: NONREACTIVE
HCT VFR BLD AUTO: 38.3 % (ref 36–46)
HCV AB SER QL: NONREACTIVE
HGB BLD-MCNC: 13.1 G/DL (ref 12–16)
HIV 1+2 AB+HIV1 P24 AG SERPL QL IA: NONREACTIVE
MCH RBC QN AUTO: 30.5 PG (ref 26–34)
MCHC RBC AUTO-ENTMCNC: 34.2 G/DL (ref 32–36)
MCV RBC AUTO: 89 FL (ref 80–100)
NRBC BLD-RTO: 0 /100 WBCS (ref 0–0)
PLATELET # BLD AUTO: 247 X10*3/UL (ref 150–450)
POTASSIUM SERPL-SCNC: 3.8 MMOL/L (ref 3.5–5.3)
PROT SERPL-MCNC: 6.9 G/DL (ref 6.4–8.2)
RBC # BLD AUTO: 4.3 X10*6/UL (ref 4–5.2)
REFLEX ADDED, ANEMIA PANEL: NORMAL
RH FACTOR (ANTIGEN D): NORMAL
RUBV IGG SERPL IA-ACNC: 1.5 IA
RUBV IGG SERPL QL IA: POSITIVE
SODIUM SERPL-SCNC: 137 MMOL/L (ref 136–145)
TREPONEMA PALLIDUM IGG+IGM AB [PRESENCE] IN SERUM OR PLASMA BY IMMUNOASSAY: NONREACTIVE
WBC # BLD AUTO: 5.7 X10*3/UL (ref 4.4–11.3)

## 2024-04-24 PROCEDURE — 83021 HEMOGLOBIN CHROMOTOGRAPHY: CPT

## 2024-04-24 PROCEDURE — 87086 URINE CULTURE/COLONY COUNT: CPT

## 2024-04-24 PROCEDURE — 86803 HEPATITIS C AB TEST: CPT

## 2024-04-24 PROCEDURE — 86780 TREPONEMA PALLIDUM: CPT

## 2024-04-24 PROCEDURE — 87389 HIV-1 AG W/HIV-1&-2 AB AG IA: CPT

## 2024-04-24 PROCEDURE — 87800 DETECT AGNT MULT DNA DIREC: CPT

## 2024-04-24 PROCEDURE — 87340 HEPATITIS B SURFACE AG IA: CPT

## 2024-04-24 PROCEDURE — 86850 RBC ANTIBODY SCREEN: CPT

## 2024-04-24 PROCEDURE — 80053 COMPREHEN METABOLIC PANEL: CPT

## 2024-04-24 PROCEDURE — 85027 COMPLETE CBC AUTOMATED: CPT

## 2024-04-24 PROCEDURE — 36415 COLL VENOUS BLD VENIPUNCTURE: CPT

## 2024-04-24 PROCEDURE — 86317 IMMUNOASSAY INFECTIOUS AGENT: CPT

## 2024-04-24 PROCEDURE — 86900 BLOOD TYPING SEROLOGIC ABO: CPT

## 2024-04-24 PROCEDURE — 86901 BLOOD TYPING SEROLOGIC RH(D): CPT

## 2024-04-24 PROCEDURE — 83020 HEMOGLOBIN ELECTROPHORESIS: CPT | Performed by: NURSE PRACTITIONER

## 2024-04-25 LAB
BACTERIA UR CULT: NORMAL
C TRACH RRNA SPEC QL NAA+PROBE: NEGATIVE
HEMOGLOBIN A2: 3.4 % (ref 2–3.5)
HEMOGLOBIN A: 96.1 % (ref 95.8–98)
HEMOGLOBIN F: 0.5 % (ref 0–2)
HEMOGLOBIN IDENTIFICATION INTERPRETATION: NORMAL
N GONORRHOEA DNA SPEC QL PROBE+SIG AMP: NEGATIVE
PATH REVIEW-HGB IDENTIFICATION: NORMAL

## 2024-05-01 NOTE — PROGRESS NOTES
Medications ordered for upcoming FET.    Patient is being seen by the  Fertility clinic for the following:     Treatment plan:   FET #5  Treatment start date: 2/9/2024  Treatment type: ART  Procedures: Embryo Transfer, Thaw (Embryo)  Fertilization method: ICSI       Treatment type: ART    10/31/23 Medication Protocol Plan for next cycle-  Plan FET with letrozole 5 mg CD3-7 with hCG trigger and Prometrium vaginally 200 mg TID. Transfer 1 blast.  No lovenox needed based on recent LAC results which were WNL- RF.    If no success with this protocol can consider suppression therapy in the future transfers if we want to try programmed FET again.          The following medications were sent into  [facility] on 1/11/24 for the above treatment:   Pregnyl 10,000IU vial for trigger       Dale KnightD

## 2024-05-13 ENCOUNTER — TELEPHONE (OUTPATIENT)
Dept: OBSTETRICS AND GYNECOLOGY | Facility: CLINIC | Age: 39
End: 2024-05-13
Payer: COMMERCIAL

## 2024-05-13 NOTE — TELEPHONE ENCOUNTER
Unable to reach pt by phone. LM on VM trying to reach to review Myriad Prequel results. Office number provided to return call. Charis Ojeda, APRN-CNP

## 2024-05-14 ENCOUNTER — HOSPITAL ENCOUNTER (OUTPATIENT)
Dept: RADIOLOGY | Facility: CLINIC | Age: 39
Discharge: HOME | End: 2024-05-14
Payer: COMMERCIAL

## 2024-05-14 DIAGNOSIS — Z87.59 HISTORY OF GESTATIONAL HYPERTENSION: ICD-10-CM

## 2024-05-14 DIAGNOSIS — O09.521 SUPERVISION OF ELDERLY MULTIGRAVIDA, FIRST TRIMESTER (HHS-HCC): ICD-10-CM

## 2024-05-14 DIAGNOSIS — O09.811 PREGNANCY RESULTING FROM IN VITRO FERTILIZATION IN FIRST TRIMESTER (HHS-HCC): ICD-10-CM

## 2024-05-14 DIAGNOSIS — O09.01: ICD-10-CM

## 2024-05-14 DIAGNOSIS — Z34.91 INITIAL OBSTETRIC VISIT IN FIRST TRIMESTER (HHS-HCC): ICD-10-CM

## 2024-05-14 PROCEDURE — 76813 OB US NUCHAL MEAS 1 GEST: CPT | Performed by: MEDICAL GENETICS

## 2024-05-14 PROCEDURE — 76813 OB US NUCHAL MEAS 1 GEST: CPT

## 2024-05-14 PROCEDURE — 76801 OB US < 14 WKS SINGLE FETUS: CPT

## 2024-05-14 PROCEDURE — 76801 OB US < 14 WKS SINGLE FETUS: CPT | Performed by: MEDICAL GENETICS

## 2024-05-16 ENCOUNTER — ROUTINE PRENATAL (OUTPATIENT)
Dept: OBSTETRICS AND GYNECOLOGY | Facility: CLINIC | Age: 39
End: 2024-05-16
Payer: COMMERCIAL

## 2024-05-16 ENCOUNTER — LAB (OUTPATIENT)
Dept: LAB | Facility: LAB | Age: 39
End: 2024-05-16
Payer: COMMERCIAL

## 2024-05-16 VITALS
OXYGEN SATURATION: 98 % | WEIGHT: 167 LBS | SYSTOLIC BLOOD PRESSURE: 133 MMHG | DIASTOLIC BLOOD PRESSURE: 77 MMHG | HEIGHT: 66 IN | TEMPERATURE: 98 F | BODY MASS INDEX: 26.84 KG/M2

## 2024-05-16 DIAGNOSIS — O09.521 AMA (ADVANCED MATERNAL AGE) MULTIGRAVIDA 35+, FIRST TRIMESTER (HHS-HCC): ICD-10-CM

## 2024-05-16 DIAGNOSIS — O09.521 AMA (ADVANCED MATERNAL AGE) MULTIGRAVIDA 35+, FIRST TRIMESTER (HHS-HCC): Primary | ICD-10-CM

## 2024-05-16 DIAGNOSIS — Z87.59 HISTORY OF GESTATIONAL HYPERTENSION: ICD-10-CM

## 2024-05-16 DIAGNOSIS — Z3A.13 13 WEEKS GESTATION OF PREGNANCY (HHS-HCC): ICD-10-CM

## 2024-05-16 DIAGNOSIS — O09.811 PREGNANCY RESULTING FROM IN VITRO FERTILIZATION IN FIRST TRIMESTER (HHS-HCC): ICD-10-CM

## 2024-05-16 LAB
CREAT UR-MCNC: 73.1 MG/DL (ref 20–320)
PROT UR-ACNC: 8 MG/DL (ref 5–24)
PROT/CREAT UR: 0.11 MG/MG CREAT (ref 0–0.17)

## 2024-05-16 PROCEDURE — 84156 ASSAY OF PROTEIN URINE: CPT

## 2024-05-16 PROCEDURE — 0501F PRENATAL FLOW SHEET: CPT | Performed by: NURSE PRACTITIONER

## 2024-05-16 PROCEDURE — 82570 ASSAY OF URINE CREATININE: CPT

## 2024-05-16 NOTE — PROGRESS NOTES
Issues today: no concerns, feeling good, normal NT and cfDNA reviewed, baby girl.  Will stop at lab and do P/C ratio  No Vag bleeding/LOF  No Ctx/abd pain  No Dysuria/abn discharge  Discussed precautions and when to call  F/U 4 weeks, anatomy US scheduled next month  Charis Ojeda, APRN-CNP

## 2024-06-14 ENCOUNTER — APPOINTMENT (OUTPATIENT)
Dept: OBSTETRICS AND GYNECOLOGY | Facility: CLINIC | Age: 39
End: 2024-06-14
Payer: COMMERCIAL

## 2024-06-14 VITALS
BODY MASS INDEX: 26.84 KG/M2 | WEIGHT: 167 LBS | SYSTOLIC BLOOD PRESSURE: 125 MMHG | DIASTOLIC BLOOD PRESSURE: 77 MMHG | HEIGHT: 66 IN

## 2024-06-14 DIAGNOSIS — Z3A.18 18 WEEKS GESTATION OF PREGNANCY (HHS-HCC): Primary | ICD-10-CM

## 2024-06-14 NOTE — PROGRESS NOTES
"Prenatal visit at 18 weeks and 0 days    Patient denies leaking fluid, bleeding or contractions.  Reviewed normal prenatal labs.  Normal blood pressure in office today.  Denies preeclamptic symptoms.    Prenatal problem list:     x1, gHTN late term last pregnancy- 2 baby ASA daily  IVF embryo transfer  AMA- plans for cfDNA testing (NEG, XX gender)  Early US done 23- single IUP 10w1d  NT normal  PC Ratio nml    /77 (BP Location: Left arm, Patient Position: Sitting)   Ht 1.676 m (5' 6\")   Wt 75.8 kg (167 lb)   LMP 2024   BMI 26.95 kg/m²       Assessment and plan: Anatomy scan scheduled, follow-up in 4 weeks.  "

## 2024-06-21 ENCOUNTER — HOSPITAL ENCOUNTER (OUTPATIENT)
Dept: RADIOLOGY | Facility: CLINIC | Age: 39
Discharge: HOME | End: 2024-06-21
Payer: COMMERCIAL

## 2024-06-21 DIAGNOSIS — Z34.91 INITIAL OBSTETRIC VISIT IN FIRST TRIMESTER (HHS-HCC): ICD-10-CM

## 2024-06-21 DIAGNOSIS — Z87.59 HISTORY OF GESTATIONAL HYPERTENSION: ICD-10-CM

## 2024-06-21 DIAGNOSIS — O09.811 PREGNANCY RESULTING FROM IN VITRO FERTILIZATION IN FIRST TRIMESTER (HHS-HCC): ICD-10-CM

## 2024-06-21 PROCEDURE — 76811 OB US DETAILED SNGL FETUS: CPT

## 2024-07-15 ENCOUNTER — APPOINTMENT (OUTPATIENT)
Dept: OBSTETRICS AND GYNECOLOGY | Facility: CLINIC | Age: 39
End: 2024-07-15
Payer: COMMERCIAL

## 2024-07-15 VITALS
DIASTOLIC BLOOD PRESSURE: 75 MMHG | BODY MASS INDEX: 27.53 KG/M2 | SYSTOLIC BLOOD PRESSURE: 118 MMHG | WEIGHT: 171.3 LBS | HEIGHT: 66 IN

## 2024-07-15 DIAGNOSIS — Z3A.22 22 WEEKS GESTATION OF PREGNANCY (HHS-HCC): Primary | ICD-10-CM

## 2024-07-15 PROCEDURE — 0501F PRENATAL FLOW SHEET: CPT | Performed by: OBSTETRICS & GYNECOLOGY

## 2024-07-15 NOTE — PROGRESS NOTES
"Prenatal visit at 22 weeks and 3 days    Patient denies leaking fluid, bleeding or contractions. Patient admits to good fetal movement.  Denies preeclamptic symptoms, continues to take baby aspirin daily.      Prenatal problem list:     x1, gHTN late term last pregnancy- 2 baby ASA daily  IVF embryo transfer  AMA- plans for cfDNA testing (NEG, XX gender)  Early US done 23- single IUP 10w1d  NT normal  PC Ratio nml  Risk-reducing induction at 39 weeks  Anatomy US nml, repeat growth at 30w    /75 (BP Location: Left arm, Patient Position: Sitting)   Ht 1.676 m (5' 6\")   Wt 77.7 kg (171 lb 4.8 oz)   LMP 2024   BMI 27.65 kg/m²     Assessment and plan: Labor precautions, fetal kick counts at home, 1 hour Glucola and CBC in 2 weeks, follow-up in 4 weeks.  "
[facility] ([facility])  Rehabilitation  [address] [place] [address]  Phone: [phone]  Fax:   Follow Up Time: 1-3 Days

## 2024-07-30 ENCOUNTER — LAB (OUTPATIENT)
Dept: LAB | Facility: LAB | Age: 39
End: 2024-07-30
Payer: COMMERCIAL

## 2024-07-30 DIAGNOSIS — Z3A.22 22 WEEKS GESTATION OF PREGNANCY (HHS-HCC): ICD-10-CM

## 2024-07-30 LAB
ERYTHROCYTE [DISTWIDTH] IN BLOOD BY AUTOMATED COUNT: 12.9 % (ref 11.5–14.5)
GLUCOSE 1H P 50 G GLC PO SERPL-MCNC: 141 MG/DL
HCT VFR BLD AUTO: 33.7 % (ref 36–46)
HGB BLD-MCNC: 11.2 G/DL (ref 12–16)
MCH RBC QN AUTO: 30.5 PG (ref 26–34)
MCHC RBC AUTO-ENTMCNC: 33.2 G/DL (ref 32–36)
MCV RBC AUTO: 92 FL (ref 80–100)
NRBC BLD-RTO: 0 /100 WBCS (ref 0–0)
PLATELET # BLD AUTO: 211 X10*3/UL (ref 150–450)
RBC # BLD AUTO: 3.67 X10*6/UL (ref 4–5.2)
REFLEX ADDED, ANEMIA PANEL: NORMAL
WBC # BLD AUTO: 7.6 X10*3/UL (ref 4.4–11.3)

## 2024-07-30 PROCEDURE — 85027 COMPLETE CBC AUTOMATED: CPT

## 2024-07-30 PROCEDURE — 82947 ASSAY GLUCOSE BLOOD QUANT: CPT

## 2024-07-30 PROCEDURE — 36415 COLL VENOUS BLD VENIPUNCTURE: CPT

## 2024-07-31 DIAGNOSIS — R73.09 GLUCOSE TOLERANCE TEST ABNORMAL: Primary | ICD-10-CM

## 2024-08-05 ENCOUNTER — LAB (OUTPATIENT)
Dept: LAB | Facility: LAB | Age: 39
End: 2024-08-05
Payer: COMMERCIAL

## 2024-08-05 DIAGNOSIS — R73.09 GLUCOSE TOLERANCE TEST ABNORMAL: ICD-10-CM

## 2024-08-05 LAB
GLUCOSE 1H P 100 G GLC PO SERPL-MCNC: 132 MG/DL
GLUCOSE 2H P 100 G GLC PO SERPL-MCNC: 111 MG/DL
GLUCOSE 3H P 100 G GLC PO SERPL-MCNC: 54 MG/DL
GLUCOSE P FAST SERPL-MCNC: 82 MG/DL

## 2024-08-05 PROCEDURE — 36415 COLL VENOUS BLD VENIPUNCTURE: CPT

## 2024-08-05 PROCEDURE — 82951 GLUCOSE TOLERANCE TEST (GTT): CPT

## 2024-08-05 PROCEDURE — 82950 GLUCOSE TEST: CPT

## 2024-08-05 PROCEDURE — 82952 GTT-ADDED SAMPLES: CPT

## 2024-08-05 PROCEDURE — 82947 ASSAY GLUCOSE BLOOD QUANT: CPT

## 2024-08-06 NOTE — RESULT ENCOUNTER NOTE
Abel Carr,    Just wanted let you know that your 3-hour glucose tolerance test came back normal.  You do not have diabetes of pregnancy.

## 2024-08-12 ENCOUNTER — APPOINTMENT (OUTPATIENT)
Dept: OBSTETRICS AND GYNECOLOGY | Facility: CLINIC | Age: 39
End: 2024-08-12
Payer: COMMERCIAL

## 2024-08-12 VITALS — SYSTOLIC BLOOD PRESSURE: 119 MMHG | WEIGHT: 174.8 LBS | BODY MASS INDEX: 28.21 KG/M2 | DIASTOLIC BLOOD PRESSURE: 68 MMHG

## 2024-08-12 DIAGNOSIS — Z3A.26 26 WEEKS GESTATION OF PREGNANCY (HHS-HCC): ICD-10-CM

## 2024-08-12 DIAGNOSIS — Z67.91 RH D NEGATIVE BLOOD TYPE: ICD-10-CM

## 2024-08-12 DIAGNOSIS — O09.812 PREGNANCY RESULTING FROM IN VITRO FERTILIZATION IN SECOND TRIMESTER (HHS-HCC): ICD-10-CM

## 2024-08-12 DIAGNOSIS — O09.522 AMA (ADVANCED MATERNAL AGE) MULTIGRAVIDA 35+, SECOND TRIMESTER (HHS-HCC): Primary | ICD-10-CM

## 2024-08-12 PROCEDURE — 0501F PRENATAL FLOW SHEET: CPT | Performed by: NURSE PRACTITIONER

## 2024-08-12 NOTE — PROGRESS NOTES
Issues today: No concerns, feeling well ; 3h GTT nml, will do T&S prior to next appt  Discussed BF and pt is going to try again, not sure she wants to do any birth control given IVF in past.  No Vag bleeding/LOF  No Ctx/abd pain  No Dysuria/abn discharge  FM's: active  Discussed PTL precautions/FM's and when to call  F/U 2 weeks, with Rhogam and will do T&S prior to appt; growth us at 30w  Planning IOL at 39w  Charis Ojeda, APRN-CNP

## 2024-08-21 ENCOUNTER — LAB (OUTPATIENT)
Dept: LAB | Facility: LAB | Age: 39
End: 2024-08-21
Payer: COMMERCIAL

## 2024-08-21 DIAGNOSIS — O09.522 AMA (ADVANCED MATERNAL AGE) MULTIGRAVIDA 35+, SECOND TRIMESTER (HHS-HCC): ICD-10-CM

## 2024-08-21 DIAGNOSIS — Z67.91 RH D NEGATIVE BLOOD TYPE: ICD-10-CM

## 2024-08-21 DIAGNOSIS — Z3A.26 26 WEEKS GESTATION OF PREGNANCY (HHS-HCC): ICD-10-CM

## 2024-08-21 PROCEDURE — 36415 COLL VENOUS BLD VENIPUNCTURE: CPT

## 2024-08-21 PROCEDURE — 86901 BLOOD TYPING SEROLOGIC RH(D): CPT

## 2024-08-21 PROCEDURE — 86900 BLOOD TYPING SEROLOGIC ABO: CPT

## 2024-08-21 PROCEDURE — 86850 RBC ANTIBODY SCREEN: CPT

## 2024-08-22 ENCOUNTER — LAB REQUISITION (OUTPATIENT)
Dept: LAB | Facility: HOSPITAL | Age: 39
End: 2024-08-22
Payer: COMMERCIAL

## 2024-08-22 DIAGNOSIS — Z3A.26 26 WEEKS GESTATION OF PREGNANCY (HHS-HCC): ICD-10-CM

## 2024-08-22 LAB
ABO GROUP (TYPE) IN BLOOD: NORMAL
ANTIBODY SCREEN: NORMAL
RH FACTOR (ANTIGEN D): NORMAL

## 2024-08-26 ENCOUNTER — APPOINTMENT (OUTPATIENT)
Dept: OBSTETRICS AND GYNECOLOGY | Facility: CLINIC | Age: 39
End: 2024-08-26
Payer: COMMERCIAL

## 2024-08-26 ENCOUNTER — LAB REQUISITION (OUTPATIENT)
Dept: LAB | Facility: HOSPITAL | Age: 39
End: 2024-08-26
Payer: COMMERCIAL

## 2024-08-26 ENCOUNTER — LAB (OUTPATIENT)
Dept: LAB | Facility: LAB | Age: 39
End: 2024-08-26
Payer: COMMERCIAL

## 2024-08-26 VITALS
HEIGHT: 66 IN | WEIGHT: 174.3 LBS | DIASTOLIC BLOOD PRESSURE: 74 MMHG | HEART RATE: 80 BPM | SYSTOLIC BLOOD PRESSURE: 123 MMHG | BODY MASS INDEX: 28.01 KG/M2

## 2024-08-26 DIAGNOSIS — Z67.91 UNSPECIFIED BLOOD TYPE, RH NEGATIVE: ICD-10-CM

## 2024-08-26 DIAGNOSIS — O09.523 SUPERVISION OF ELDERLY MULTIGRAVIDA, THIRD TRIMESTER (HHS-HCC): ICD-10-CM

## 2024-08-26 DIAGNOSIS — Z23 NEED FOR TDAP VACCINATION: ICD-10-CM

## 2024-08-26 DIAGNOSIS — Z67.91 RH D NEGATIVE BLOOD TYPE: ICD-10-CM

## 2024-08-26 DIAGNOSIS — Z71.85 VACCINE COUNSELING: ICD-10-CM

## 2024-08-26 DIAGNOSIS — O09.523 AMA (ADVANCED MATERNAL AGE) MULTIGRAVIDA 35+, THIRD TRIMESTER (HHS-HCC): Primary | ICD-10-CM

## 2024-08-26 DIAGNOSIS — Z3A.28 28 WEEKS GESTATION OF PREGNANCY (HHS-HCC): ICD-10-CM

## 2024-08-26 DIAGNOSIS — O09.523 AMA (ADVANCED MATERNAL AGE) MULTIGRAVIDA 35+, THIRD TRIMESTER (HHS-HCC): ICD-10-CM

## 2024-08-26 LAB
ABO GROUP (TYPE) IN BLOOD: NORMAL
ANTIBODY SCREEN: NORMAL
RH FACTOR (ANTIGEN D): NORMAL

## 2024-08-26 PROCEDURE — 86900 BLOOD TYPING SEROLOGIC ABO: CPT

## 2024-08-26 PROCEDURE — 0501F PRENATAL FLOW SHEET: CPT | Performed by: NURSE PRACTITIONER

## 2024-08-26 PROCEDURE — 36415 COLL VENOUS BLD VENIPUNCTURE: CPT

## 2024-08-26 PROCEDURE — 90715 TDAP VACCINE 7 YRS/> IM: CPT | Performed by: NURSE PRACTITIONER

## 2024-08-26 PROCEDURE — 86850 RBC ANTIBODY SCREEN: CPT

## 2024-08-26 PROCEDURE — 90471 IMMUNIZATION ADMIN: CPT | Performed by: NURSE PRACTITIONER

## 2024-08-26 PROCEDURE — 86901 BLOOD TYPING SEROLOGIC RH(D): CPT

## 2024-08-26 NOTE — PROGRESS NOTES
Issues today: T&S done too early, will complete today and have Rhogam tomorrow. Daughter had rash recently for couple days, localized to leg, discussed unlikely exposure to fetus  Patient was counseled on the recommendation for and benefits of Tdap vaccination during pregnancy. We discussed the passive immunity that occurs after maternal vaccination during pregnancy, and the antibodies that cross the placenta to the fetus to protect baby in the first few months of life. Babies do not receive their first vaccine for pertussis/whooping cough until 2 months of life, during which the baby is vulnerable to this bacterial infection. Whooping cough can cause severe and even life-threatening infections, and maternal vaccination between 27 and 36 weeks of pregnancy is the best method to protect baby from whooping cough until they are eligible for the vaccine. After discussion the patient accepts the vaccine. Greater than 5 minutes were spent on counseling related to vaccine recommendations and safety.    No Vag bleeding/LOF  No Ctx/abd pain  No Dysuria/abn discharge  FM's: active  Discussed PTL precautions/FKC's and when to call  Tdap done today  F/U 2 weeks, needs Rhogam, T&S today; growth US at 30w  Charis Ojeda, CHAPARRO-CNP

## 2024-08-27 ENCOUNTER — PROCEDURE VISIT (OUTPATIENT)
Dept: OBSTETRICS AND GYNECOLOGY | Facility: CLINIC | Age: 39
End: 2024-08-27
Payer: COMMERCIAL

## 2024-08-27 DIAGNOSIS — Z3A.28 28 WEEKS GESTATION OF PREGNANCY (HHS-HCC): ICD-10-CM

## 2024-08-27 DIAGNOSIS — Z67.91 RH D NEGATIVE BLOOD TYPE: ICD-10-CM

## 2024-08-27 PROCEDURE — 96372 THER/PROPH/DIAG INJ SC/IM: CPT | Performed by: NURSE PRACTITIONER

## 2024-08-27 RX ORDER — HUMAN RHO(D) IMMUNE GLOBULIN 1500 [IU]/1.3ML
INJECTION, SOLUTION INTRAMUSCULAR; INTRAVENOUS
Qty: 2.6 ML | Refills: 0 | OUTPATIENT
Start: 2024-08-27

## 2024-09-06 ENCOUNTER — HOSPITAL ENCOUNTER (OUTPATIENT)
Dept: RADIOLOGY | Facility: CLINIC | Age: 39
Discharge: HOME | End: 2024-09-06
Payer: COMMERCIAL

## 2024-09-06 DIAGNOSIS — O09.811 PREGNANCY RESULTING FROM IN VITRO FERTILIZATION IN FIRST TRIMESTER (HHS-HCC): ICD-10-CM

## 2024-09-06 DIAGNOSIS — Z87.59 HISTORY OF GESTATIONAL HYPERTENSION: ICD-10-CM

## 2024-09-06 DIAGNOSIS — Z34.91 INITIAL OBSTETRIC VISIT IN FIRST TRIMESTER (HHS-HCC): ICD-10-CM

## 2024-09-06 PROCEDURE — 76816 OB US FOLLOW-UP PER FETUS: CPT

## 2024-09-06 PROCEDURE — 76819 FETAL BIOPHYS PROFIL W/O NST: CPT

## 2024-09-09 ENCOUNTER — APPOINTMENT (OUTPATIENT)
Dept: OBSTETRICS AND GYNECOLOGY | Facility: CLINIC | Age: 39
End: 2024-09-09
Payer: COMMERCIAL

## 2024-09-09 VITALS
HEIGHT: 66 IN | BODY MASS INDEX: 28.61 KG/M2 | DIASTOLIC BLOOD PRESSURE: 72 MMHG | WEIGHT: 178 LBS | SYSTOLIC BLOOD PRESSURE: 108 MMHG

## 2024-09-09 DIAGNOSIS — O09.523 AMA (ADVANCED MATERNAL AGE) MULTIGRAVIDA 35+, THIRD TRIMESTER (HHS-HCC): Primary | ICD-10-CM

## 2024-09-09 DIAGNOSIS — Z3A.30 30 WEEKS GESTATION OF PREGNANCY (HHS-HCC): ICD-10-CM

## 2024-09-09 PROCEDURE — 0501F PRENATAL FLOW SHEET: CPT | Performed by: NURSE PRACTITIONER

## 2024-09-09 ASSESSMENT — PAIN SCALES - GENERAL: PAINLEVEL: 0-NO PAIN

## 2024-09-09 NOTE — PROGRESS NOTES
Issues today: feeling good;  US done 9/9/24, reviewed, fetus breech and discussed, 40%ile, BPP 8/8  No Vag bleeding/LOF  No Ctx/abd pain  No Dysuria/abn discharge  FM's: active  Discussed PTL precautions/FKC's and when to call  F/U 2 weeks  Charis Ojeda, APRN-CNP

## 2024-09-24 ENCOUNTER — APPOINTMENT (OUTPATIENT)
Dept: OBSTETRICS AND GYNECOLOGY | Facility: CLINIC | Age: 39
End: 2024-09-24
Payer: COMMERCIAL

## 2024-09-24 VITALS — SYSTOLIC BLOOD PRESSURE: 120 MMHG | WEIGHT: 179.1 LBS | BODY MASS INDEX: 28.91 KG/M2 | DIASTOLIC BLOOD PRESSURE: 83 MMHG

## 2024-09-24 DIAGNOSIS — Z3A.32 32 WEEKS GESTATION OF PREGNANCY (HHS-HCC): Primary | ICD-10-CM

## 2024-09-24 PROCEDURE — 0501F PRENATAL FLOW SHEET: CPT | Performed by: OBSTETRICS & GYNECOLOGY

## 2024-09-24 NOTE — PROGRESS NOTES
Prenatal visit at 32 weeks and 4 days    Patient denies leaking fluid, bleeding or contractions. Patient admits to good fetal movement.    Prenatal problem list:     x1, gHTN late term last pregnancy- 2 baby ASA daily  IVF embryo transfer  AMA- cfDNA testing (NEG, XX gender)  NT normal  PC Ratio nml  Risk-reducing induction at 39 weeks  Anatomy US nml, repeat growth at 30w  Glucola= 141, 3h GTT nml  A NEG  Peds: [facility];   Tdap Received - 24/AC  Rhogam 24    /83   Wt 81.2 kg (179 lb 1.6 oz)   LMP 2024   BMI 28.91 kg/m²       Assessment and plan: Labor precautions, fetal kick counts at home, growth ultrasound scheduled.  Follow-up in 2 weeks.

## 2024-10-08 ENCOUNTER — APPOINTMENT (OUTPATIENT)
Dept: OBSTETRICS AND GYNECOLOGY | Facility: CLINIC | Age: 39
End: 2024-10-08
Payer: COMMERCIAL

## 2024-10-08 VITALS — WEIGHT: 180.7 LBS | SYSTOLIC BLOOD PRESSURE: 117 MMHG | DIASTOLIC BLOOD PRESSURE: 80 MMHG | BODY MASS INDEX: 29.17 KG/M2

## 2024-10-08 DIAGNOSIS — Z3A.34 34 WEEKS GESTATION OF PREGNANCY (HHS-HCC): Primary | ICD-10-CM

## 2024-10-08 PROCEDURE — 0501F PRENATAL FLOW SHEET: CPT | Performed by: OBSTETRICS & GYNECOLOGY

## 2024-10-08 NOTE — PATIENT INSTRUCTIONS
Pregnant women are highly encouraged to get the flu shot for several important reasons:    1. Increased Risk of Severe Illness:  During pregnancy, the immune system, heart, and lungs undergo changes that make pregnant women more vulnerable to severe complications from the flu.  Flu complications such as pneumonia, bronchitis, and even hospitalization are more common in pregnant women than in non-pregnant women.  The flu can also increase the risk of  labor and  birth, which can lead to additional complications for the baby.  2. Protecting the Baby After Birth:  When a pregnant woman gets the flu shot, she passes antibodies to her unborn baby through the placenta, giving the baby some protection against the flu during the first few months of life.  This is important because babies under 6 months of age are too young to receive the flu vaccine themselves, but they are at high risk of serious complications if they get the flu.  3. Safe and Recommended During Pregnancy:  The flu shot (inactivated influenza vaccine) is safe and has been given to millions of pregnant women without harmful effects to the mother or baby.  The nasal spray flu vaccine (live attenuated influenza vaccine) is not recommended for pregnant women, but the injectable flu vaccine (inactivated) is considered safe at any stage of pregnancy.  4. Protecting the Mother and Reducing Health Complications:  Getting the flu shot helps reduce the risk of flu-related complications during pregnancy, including hospitalization and even death in severe cases.  It also helps protect women from serious infections that can lead to miscarriage or complications during labor.  5. Reduces Risk of Other Pregnancy Complications:  The flu can increase the likelihood of dehydration, high fevers, and respiratory complications, which can be harmful during pregnancy. Fever, particularly in the first trimester, is associated with an increased risk of neural tube  defects in the baby.  6. Community Protection:  By getting vaccinated, pregnant women also help to protect those around them (including family members and others who may be vulnerable to the flu) through herd immunity.  Timing of the Flu Shot:  Pregnant women should get the flu shot during flu season, which typically runs from October to May. The best time to get the vaccine is as soon as it's available in your area.  The vaccine is beneficial at any stage of pregnancy (first, second, or third trimester).  Summary:  The flu shot is a crucial preventive measure for pregnant women because it protects both the mother and the baby from flu-related complications.  It is safe, effective, and can be administered at any stage of pregnancy.

## 2024-10-08 NOTE — PROGRESS NOTES
Prenatal visit at 34 weeks and 4 days    Patient denies leaking fluid, bleeding or contractions. Patient admits to good fetal movement.  Declined flu vaccine today because he recently got over upper respiratory infection, she will follow-up in 1 week and most likely will get at that time.    Prenatal problem list:     x1, gHTN late term last pregnancy- 2 baby ASA daily  IVF embryo transfer  AMA- cfDNA testing (NEG, XX gender)  NT normal  PC Ratio nml  Risk-reducing induction at 39 weeks  Anatomy US nml, repeat growth at 30w  Glucola= 141, 3h GTT nml  A NEG  Peds: [facility];   Tdap Received - 24/AC  Rhogam 24  Declined flu vacc-10/8/24      /80   Wt 82 kg (180 lb 11.2 oz)   LMP 2024   BMI 29.17 kg/m²     Assessment and plan: Labor precautions, fetal kick counts at home, growth ultrasound next week.  Patient will follow-up in 1 week.  Discussed risk reducing induction at 39 weeks, patient very interested in this.  Follow-up in 1 week.

## 2024-10-14 ENCOUNTER — APPOINTMENT (OUTPATIENT)
Dept: OBSTETRICS AND GYNECOLOGY | Facility: CLINIC | Age: 39
End: 2024-10-14
Payer: COMMERCIAL

## 2024-10-14 VITALS — DIASTOLIC BLOOD PRESSURE: 79 MMHG | WEIGHT: 179.7 LBS | SYSTOLIC BLOOD PRESSURE: 121 MMHG | BODY MASS INDEX: 29 KG/M2

## 2024-10-14 DIAGNOSIS — O09.523 AMA (ADVANCED MATERNAL AGE) MULTIGRAVIDA 35+, THIRD TRIMESTER (HHS-HCC): Primary | ICD-10-CM

## 2024-10-14 DIAGNOSIS — Z3A.35 35 WEEKS GESTATION OF PREGNANCY (HHS-HCC): ICD-10-CM

## 2024-10-14 DIAGNOSIS — Z23 NEED FOR VACCINATION: ICD-10-CM

## 2024-10-14 NOTE — PROGRESS NOTES
Issues today: no concerns, feeling well; discussed flu vaccine with R/B/SE and pt accepts. Feeling better since her last cold.  No Vag bleeding/LOF  Occas BH Ctx/abd pain  No Dysuria/abn discharge  FM's: active  Discussed PTL precautions/FKC's and when to call  F/U 1 week, gbs next visit, has US this coming week  Charis Ojeda, APRN-CNP

## 2024-10-17 ENCOUNTER — HOSPITAL ENCOUNTER (OUTPATIENT)
Dept: RADIOLOGY | Facility: CLINIC | Age: 39
Discharge: HOME | End: 2024-10-17
Payer: COMMERCIAL

## 2024-10-17 DIAGNOSIS — Z87.59 HISTORY OF GESTATIONAL HYPERTENSION: ICD-10-CM

## 2024-10-17 DIAGNOSIS — Z34.91 INITIAL OBSTETRIC VISIT IN FIRST TRIMESTER (HHS-HCC): ICD-10-CM

## 2024-10-17 DIAGNOSIS — O09.811 PREGNANCY RESULTING FROM IN VITRO FERTILIZATION IN FIRST TRIMESTER (HHS-HCC): ICD-10-CM

## 2024-10-17 DIAGNOSIS — O09.529 AMA (ADVANCED MATERNAL AGE) MULTIGRAVIDA 35+, UNSPECIFIED TRIMESTER (HHS-HCC): ICD-10-CM

## 2024-10-17 PROCEDURE — 76819 FETAL BIOPHYS PROFIL W/O NST: CPT

## 2024-10-17 PROCEDURE — 76819 FETAL BIOPHYS PROFIL W/O NST: CPT | Performed by: STUDENT IN AN ORGANIZED HEALTH CARE EDUCATION/TRAINING PROGRAM

## 2024-10-17 PROCEDURE — 76816 OB US FOLLOW-UP PER FETUS: CPT | Performed by: STUDENT IN AN ORGANIZED HEALTH CARE EDUCATION/TRAINING PROGRAM

## 2024-10-17 PROCEDURE — 76816 OB US FOLLOW-UP PER FETUS: CPT

## 2024-10-22 ENCOUNTER — APPOINTMENT (OUTPATIENT)
Dept: OBSTETRICS AND GYNECOLOGY | Facility: CLINIC | Age: 39
End: 2024-10-22
Payer: COMMERCIAL

## 2024-10-22 VITALS — WEIGHT: 181.3 LBS | BODY MASS INDEX: 29.26 KG/M2 | SYSTOLIC BLOOD PRESSURE: 122 MMHG | DIASTOLIC BLOOD PRESSURE: 83 MMHG

## 2024-10-22 DIAGNOSIS — Z3A.36 36 WEEKS GESTATION OF PREGNANCY (HHS-HCC): ICD-10-CM

## 2024-10-22 DIAGNOSIS — O09.523 AMA (ADVANCED MATERNAL AGE) MULTIGRAVIDA 35+, THIRD TRIMESTER (HHS-HCC): Primary | ICD-10-CM

## 2024-10-22 PROCEDURE — 0501F PRENATAL FLOW SHEET: CPT | Performed by: NURSE PRACTITIONER

## 2024-10-22 PROCEDURE — 87081 CULTURE SCREEN ONLY: CPT

## 2024-10-22 NOTE — PROGRESS NOTES
Issues today: no concerns, feeling more pelvic pressure but no ctxs; US done last week nml growth, BPP 8/8  GBS discussed and done today  No Vag bleeding/LOF  No Ctx/abd pain  No Dysuria/abn discharge  FM's: active  Discussed PTL precautions/FKC's and \when to call  F/U 1 week, GBS sent, planning for 39w IOL risk reducing  Charis Ojeda, APRN-CNP

## 2024-10-25 LAB — GP B STREP GENITAL QL CULT: NORMAL

## 2024-10-29 ENCOUNTER — APPOINTMENT (OUTPATIENT)
Dept: OBSTETRICS AND GYNECOLOGY | Facility: CLINIC | Age: 39
End: 2024-10-29

## 2024-10-29 VITALS — DIASTOLIC BLOOD PRESSURE: 87 MMHG | WEIGHT: 180.2 LBS | SYSTOLIC BLOOD PRESSURE: 132 MMHG | BODY MASS INDEX: 29.09 KG/M2

## 2024-10-29 DIAGNOSIS — Z3A.37 37 WEEKS GESTATION OF PREGNANCY (HHS-HCC): Primary | ICD-10-CM

## 2024-10-29 PROCEDURE — 0501F PRENATAL FLOW SHEET: CPT | Performed by: OBSTETRICS & GYNECOLOGY

## 2024-10-30 ENCOUNTER — ANESTHESIA (OUTPATIENT)
Dept: OBSTETRICS AND GYNECOLOGY | Facility: HOSPITAL | Age: 39
End: 2024-10-30
Payer: COMMERCIAL

## 2024-10-30 ENCOUNTER — ANESTHESIA EVENT (OUTPATIENT)
Dept: OBSTETRICS AND GYNECOLOGY | Facility: HOSPITAL | Age: 39
End: 2024-10-30
Payer: COMMERCIAL

## 2024-10-30 ENCOUNTER — HOSPITAL ENCOUNTER (INPATIENT)
Facility: HOSPITAL | Age: 39
LOS: 2 days | Discharge: HOME | End: 2024-11-01
Attending: OBSTETRICS & GYNECOLOGY | Admitting: OBSTETRICS & GYNECOLOGY
Payer: COMMERCIAL

## 2024-10-30 PROBLEM — Z37.9 NORMAL LABOR (HHS-HCC): Status: ACTIVE | Noted: 2024-10-30

## 2024-10-30 PROBLEM — D64.9 ANEMIA: Status: ACTIVE | Noted: 2024-10-30

## 2024-10-30 LAB
ERYTHROCYTE [DISTWIDTH] IN BLOOD BY AUTOMATED COUNT: 13.2 % (ref 11.5–14.5)
HCT VFR BLD AUTO: 33.6 % (ref 36–46)
HGB BLD-MCNC: 10.9 G/DL (ref 12–16)
MCH RBC QN AUTO: 28.2 PG (ref 26–34)
MCHC RBC AUTO-ENTMCNC: 32.4 G/DL (ref 32–36)
MCV RBC AUTO: 87 FL (ref 80–100)
NRBC BLD-RTO: 0 /100 WBCS (ref 0–0)
PLATELET # BLD AUTO: 220 X10*3/UL (ref 150–450)
POC APPEARANCE, URINE: CLEAR
POC BILIRUBIN, URINE: NEGATIVE
POC BLOOD, URINE: ABNORMAL
POC COLOR, URINE: YELLOW
POC GLUCOSE, URINE: NEGATIVE MG/DL
POC KETONES, URINE: NEGATIVE MG/DL
POC LEUKOCYTES, URINE: NEGATIVE
POC NITRITE,URINE: NEGATIVE
POC PH, URINE: 6.5 PH
POC PROTEIN, URINE: NEGATIVE MG/DL
POC SPECIFIC GRAVITY, URINE: 1.01
POC UROBILINOGEN, URINE: 0.2 EU/DL
RBC # BLD AUTO: 3.86 X10*6/UL (ref 4–5.2)
WBC # BLD AUTO: 12.6 X10*3/UL (ref 4.4–11.3)

## 2024-10-30 PROCEDURE — 7210000002 HC LABOR PER HOUR

## 2024-10-30 PROCEDURE — 2500000004 HC RX 250 GENERAL PHARMACY W/ HCPCS (ALT 636 FOR OP/ED): Performed by: ANESTHESIOLOGY

## 2024-10-30 PROCEDURE — 1120000001 HC OB PRIVATE ROOM DAILY

## 2024-10-30 PROCEDURE — 2500000004 HC RX 250 GENERAL PHARMACY W/ HCPCS (ALT 636 FOR OP/ED)

## 2024-10-30 PROCEDURE — 86901 BLOOD TYPING SEROLOGIC RH(D): CPT

## 2024-10-30 PROCEDURE — 36415 COLL VENOUS BLD VENIPUNCTURE: CPT

## 2024-10-30 PROCEDURE — 81002 URINALYSIS NONAUTO W/O SCOPE: CPT

## 2024-10-30 PROCEDURE — 3700000014 EPIDURAL BLOCK: Performed by: ANESTHESIOLOGY

## 2024-10-30 PROCEDURE — 99214 OFFICE O/P EST MOD 30 MIN: CPT

## 2024-10-30 PROCEDURE — 86780 TREPONEMA PALLIDUM: CPT

## 2024-10-30 PROCEDURE — 85027 COMPLETE CBC AUTOMATED: CPT

## 2024-10-30 RX ORDER — TRANEXAMIC ACID 100 MG/ML
1000 INJECTION, SOLUTION INTRAVENOUS ONCE AS NEEDED
Status: DISCONTINUED | OUTPATIENT
Start: 2024-10-30 | End: 2024-10-31 | Stop reason: HOSPADM

## 2024-10-30 RX ORDER — LOPERAMIDE HYDROCHLORIDE 2 MG/1
4 CAPSULE ORAL EVERY 2 HOUR PRN
Status: DISCONTINUED | OUTPATIENT
Start: 2024-10-30 | End: 2024-10-31 | Stop reason: HOSPADM

## 2024-10-30 RX ORDER — MISOPROSTOL 200 UG/1
800 TABLET ORAL ONCE AS NEEDED
Status: DISCONTINUED | OUTPATIENT
Start: 2024-10-30 | End: 2024-10-31 | Stop reason: HOSPADM

## 2024-10-30 RX ORDER — FENTANYL/ROPIVACAINE/NS/PF 2MCG/ML-.2
0-25 PLASTIC BAG, INJECTION (ML) INJECTION CONTINUOUS
Status: DISCONTINUED | OUTPATIENT
Start: 2024-10-30 | End: 2024-10-31

## 2024-10-30 RX ORDER — TERBUTALINE SULFATE 1 MG/ML
0.25 INJECTION SUBCUTANEOUS ONCE AS NEEDED
Status: DISCONTINUED | OUTPATIENT
Start: 2024-10-30 | End: 2024-10-31 | Stop reason: HOSPADM

## 2024-10-30 RX ORDER — OXYTOCIN/0.9 % SODIUM CHLORIDE 30/500 ML
2-30 PLASTIC BAG, INJECTION (ML) INTRAVENOUS CONTINUOUS
Status: DISCONTINUED | OUTPATIENT
Start: 2024-10-31 | End: 2024-10-31

## 2024-10-30 RX ORDER — METHYLERGONOVINE MALEATE 0.2 MG/ML
0.2 INJECTION INTRAVENOUS ONCE AS NEEDED
Status: DISCONTINUED | OUTPATIENT
Start: 2024-10-30 | End: 2024-10-31 | Stop reason: HOSPADM

## 2024-10-30 RX ORDER — LIDOCAINE HYDROCHLORIDE 10 MG/ML
30 INJECTION, SOLUTION INFILTRATION; PERINEURAL ONCE AS NEEDED
Status: DISCONTINUED | OUTPATIENT
Start: 2024-10-30 | End: 2024-10-31 | Stop reason: HOSPADM

## 2024-10-30 RX ORDER — OXYTOCIN 10 [USP'U]/ML
10 INJECTION, SOLUTION INTRAMUSCULAR; INTRAVENOUS ONCE AS NEEDED
Status: COMPLETED | OUTPATIENT
Start: 2024-10-30 | End: 2024-10-31

## 2024-10-30 RX ORDER — ONDANSETRON HYDROCHLORIDE 2 MG/ML
4 INJECTION, SOLUTION INTRAVENOUS EVERY 6 HOURS PRN
Status: DISCONTINUED | OUTPATIENT
Start: 2024-10-30 | End: 2024-10-31

## 2024-10-30 RX ORDER — OXYTOCIN 10 [USP'U]/ML
10 INJECTION, SOLUTION INTRAMUSCULAR; INTRAVENOUS ONCE AS NEEDED
Status: DISCONTINUED | OUTPATIENT
Start: 2024-10-30 | End: 2024-10-31 | Stop reason: HOSPADM

## 2024-10-30 RX ORDER — HYDRALAZINE HYDROCHLORIDE 20 MG/ML
5 INJECTION INTRAMUSCULAR; INTRAVENOUS ONCE AS NEEDED
Status: DISCONTINUED | OUTPATIENT
Start: 2024-10-30 | End: 2024-10-31 | Stop reason: HOSPADM

## 2024-10-30 RX ORDER — ONDANSETRON 4 MG/1
4 TABLET, FILM COATED ORAL EVERY 6 HOURS PRN
Status: DISCONTINUED | OUTPATIENT
Start: 2024-10-30 | End: 2024-10-31

## 2024-10-30 RX ORDER — CARBOPROST TROMETHAMINE 250 UG/ML
250 INJECTION, SOLUTION INTRAMUSCULAR ONCE AS NEEDED
Status: DISCONTINUED | OUTPATIENT
Start: 2024-10-30 | End: 2024-10-31 | Stop reason: HOSPADM

## 2024-10-30 RX ORDER — METOCLOPRAMIDE 10 MG/1
10 TABLET ORAL EVERY 6 HOURS PRN
Status: DISCONTINUED | OUTPATIENT
Start: 2024-10-30 | End: 2024-10-31

## 2024-10-30 RX ORDER — SODIUM CHLORIDE, SODIUM LACTATE, POTASSIUM CHLORIDE, CALCIUM CHLORIDE 600; 310; 30; 20 MG/100ML; MG/100ML; MG/100ML; MG/100ML
10 INJECTION, SOLUTION INTRAVENOUS CONTINUOUS
Status: DISCONTINUED | OUTPATIENT
Start: 2024-10-31 | End: 2024-10-31

## 2024-10-30 RX ORDER — METOCLOPRAMIDE HYDROCHLORIDE 5 MG/ML
10 INJECTION INTRAMUSCULAR; INTRAVENOUS EVERY 6 HOURS PRN
Status: DISCONTINUED | OUTPATIENT
Start: 2024-10-30 | End: 2024-10-31

## 2024-10-30 RX ORDER — LABETALOL HYDROCHLORIDE 5 MG/ML
20 INJECTION, SOLUTION INTRAVENOUS ONCE AS NEEDED
Status: DISCONTINUED | OUTPATIENT
Start: 2024-10-30 | End: 2024-10-31 | Stop reason: HOSPADM

## 2024-10-30 RX ORDER — OXYTOCIN/0.9 % SODIUM CHLORIDE 30/500 ML
60 PLASTIC BAG, INJECTION (ML) INTRAVENOUS ONCE AS NEEDED
Status: DISCONTINUED | OUTPATIENT
Start: 2024-10-30 | End: 2024-10-31 | Stop reason: HOSPADM

## 2024-10-30 RX ORDER — NIFEDIPINE 10 MG/1
10 CAPSULE ORAL ONCE AS NEEDED
Status: DISCONTINUED | OUTPATIENT
Start: 2024-10-30 | End: 2024-10-31 | Stop reason: HOSPADM

## 2024-10-30 SDOH — SOCIAL STABILITY: SOCIAL INSECURITY: DOES ANYONE TRY TO KEEP YOU FROM HAVING/CONTACTING OTHER FRIENDS OR DOING THINGS OUTSIDE YOUR HOME?: NO

## 2024-10-30 SDOH — SOCIAL STABILITY: SOCIAL INSECURITY: DO YOU FEEL ANYONE HAS EXPLOITED OR TAKEN ADVANTAGE OF YOU FINANCIALLY OR OF YOUR PERSONAL PROPERTY?: NO

## 2024-10-30 SDOH — HEALTH STABILITY: MENTAL HEALTH: HAVE YOU USED ANY PRESCRIPTION DRUGS OTHER THAN PRESCRIBED IN THE PAST 12 MONTHS?: NO

## 2024-10-30 SDOH — HEALTH STABILITY: MENTAL HEALTH: WERE YOU ABLE TO COMPLETE ALL THE BEHAVIORAL HEALTH SCREENINGS?: YES

## 2024-10-30 SDOH — ECONOMIC STABILITY: HOUSING INSECURITY: DO YOU FEEL UNSAFE GOING BACK TO THE PLACE WHERE YOU ARE LIVING?: NO

## 2024-10-30 SDOH — HEALTH STABILITY: MENTAL HEALTH: CURRENT SMOKER: 0

## 2024-10-30 SDOH — SOCIAL STABILITY: SOCIAL INSECURITY: ABUSE SCREEN: ADULT

## 2024-10-30 SDOH — SOCIAL STABILITY: SOCIAL INSECURITY: ARE THERE ANY APPARENT SIGNS OF INJURIES/BEHAVIORS THAT COULD BE RELATED TO ABUSE/NEGLECT?: NO

## 2024-10-30 SDOH — SOCIAL STABILITY: SOCIAL INSECURITY: HAVE YOU HAD ANY THOUGHTS OF HARMING ANYONE ELSE?: NO

## 2024-10-30 SDOH — HEALTH STABILITY: MENTAL HEALTH: NON-SPECIFIC ACTIVE SUICIDAL THOUGHTS (PAST 1 MONTH): NO

## 2024-10-30 SDOH — SOCIAL STABILITY: SOCIAL INSECURITY: HAS ANYONE EVER THREATENED TO HURT YOUR FAMILY OR YOUR PETS?: NO

## 2024-10-30 SDOH — HEALTH STABILITY: MENTAL HEALTH: HAVE YOU USED ANY SUBSTANCES (CANABIS, COCAINE, HEROIN, HALLUCINOGENS, INHALANTS, ETC.) IN THE PAST 12 MONTHS?: NO

## 2024-10-30 SDOH — SOCIAL STABILITY: SOCIAL INSECURITY: ARE YOU OR HAVE YOU BEEN THREATENED OR ABUSED PHYSICALLY, EMOTIONALLY, OR SEXUALLY BY ANYONE?: NO

## 2024-10-30 SDOH — HEALTH STABILITY: MENTAL HEALTH: WISH TO BE DEAD (PAST 1 MONTH): NO

## 2024-10-30 SDOH — SOCIAL STABILITY: SOCIAL INSECURITY: HAVE YOU HAD THOUGHTS OF HARMING ANYONE ELSE?: NO

## 2024-10-30 SDOH — SOCIAL STABILITY: SOCIAL INSECURITY: VERBAL ABUSE: DENIES

## 2024-10-30 SDOH — SOCIAL STABILITY: SOCIAL INSECURITY: PHYSICAL ABUSE: DENIES

## 2024-10-30 ASSESSMENT — LIFESTYLE VARIABLES
SKIP TO QUESTIONS 9-10: 1
HOW OFTEN DO YOU HAVE 6 OR MORE DRINKS ON ONE OCCASION: NEVER
HOW MANY STANDARD DRINKS CONTAINING ALCOHOL DO YOU HAVE ON A TYPICAL DAY: PATIENT DOES NOT DRINK
HOW OFTEN DO YOU HAVE A DRINK CONTAINING ALCOHOL: NEVER
AUDIT-C TOTAL SCORE: 0
AUDIT-C TOTAL SCORE: 0

## 2024-10-30 ASSESSMENT — ACTIVITIES OF DAILY LIVING (ADL): LACK_OF_TRANSPORTATION: NO

## 2024-10-30 ASSESSMENT — PAIN SCALES - GENERAL
PAINLEVEL_OUTOF10: 0 - NO PAIN
PAINLEVEL_OUTOF10: 7
PAINLEVEL_OUTOF10: 0 - NO PAIN

## 2024-10-30 ASSESSMENT — PATIENT HEALTH QUESTIONNAIRE - PHQ9
1. LITTLE INTEREST OR PLEASURE IN DOING THINGS: NOT AT ALL
SUM OF ALL RESPONSES TO PHQ9 QUESTIONS 1 & 2: 0
2. FEELING DOWN, DEPRESSED OR HOPELESS: NOT AT ALL

## 2024-10-31 LAB
ABO GROUP (TYPE) IN BLOOD: NORMAL
ANTIBODY SCREEN: NORMAL
BB ANTIBODY IDENTIFICATION: NORMAL
CASE #: NORMAL
PATH REV-IMMUNOHEMATOLOGY-PR30: NORMAL
RH FACTOR (ANTIGEN D): NORMAL
TREPONEMA PALLIDUM IGG+IGM AB [PRESENCE] IN SERUM OR PLASMA BY IMMUNOASSAY: NONREACTIVE

## 2024-10-31 PROCEDURE — 0KQM0ZZ REPAIR PERINEUM MUSCLE, OPEN APPROACH: ICD-10-PCS | Performed by: OBSTETRICS & GYNECOLOGY

## 2024-10-31 PROCEDURE — 2500000004 HC RX 250 GENERAL PHARMACY W/ HCPCS (ALT 636 FOR OP/ED)

## 2024-10-31 PROCEDURE — 36415 COLL VENOUS BLD VENIPUNCTURE: CPT | Performed by: STUDENT IN AN ORGANIZED HEALTH CARE EDUCATION/TRAINING PROGRAM

## 2024-10-31 PROCEDURE — 85461 HEMOGLOBIN FETAL: CPT

## 2024-10-31 PROCEDURE — 1210000001 HC SEMI-PRIVATE ROOM DAILY

## 2024-10-31 PROCEDURE — 2500000001 HC RX 250 WO HCPCS SELF ADMINISTERED DRUGS (ALT 637 FOR MEDICARE OP)

## 2024-10-31 PROCEDURE — 59400 OBSTETRICAL CARE: CPT

## 2024-10-31 PROCEDURE — 59409 OBSTETRICAL CARE: CPT | Performed by: OBSTETRICS & GYNECOLOGY

## 2024-10-31 PROCEDURE — 2500000004 HC RX 250 GENERAL PHARMACY W/ HCPCS (ALT 636 FOR OP/ED): Performed by: OBSTETRICS & GYNECOLOGY

## 2024-10-31 PROCEDURE — 7210000002 HC LABOR PER HOUR

## 2024-10-31 PROCEDURE — 2500000005 HC RX 250 GENERAL PHARMACY W/O HCPCS

## 2024-10-31 PROCEDURE — 59050 FETAL MONITOR W/REPORT: CPT | Performed by: OBSTETRICS & GYNECOLOGY

## 2024-10-31 PROCEDURE — 86870 RBC ANTIBODY IDENTIFICATION: CPT

## 2024-10-31 PROCEDURE — 51701 INSERT BLADDER CATHETER: CPT

## 2024-10-31 PROCEDURE — 7100000016 HC LABOR RECOVERY PER HOUR

## 2024-10-31 RX ORDER — POLYETHYLENE GLYCOL 3350 17 G/17G
17 POWDER, FOR SOLUTION ORAL 2 TIMES DAILY PRN
Status: DISCONTINUED | OUTPATIENT
Start: 2024-10-31 | End: 2024-11-01 | Stop reason: HOSPADM

## 2024-10-31 RX ORDER — LIDOCAINE 560 MG/1
1 PATCH PERCUTANEOUS; TOPICAL; TRANSDERMAL
Status: DISCONTINUED | OUTPATIENT
Start: 2024-10-31 | End: 2024-11-01 | Stop reason: HOSPADM

## 2024-10-31 RX ORDER — DIPHENHYDRAMINE HCL 25 MG
25 CAPSULE ORAL EVERY 6 HOURS PRN
Status: DISCONTINUED | OUTPATIENT
Start: 2024-10-31 | End: 2024-11-01 | Stop reason: HOSPADM

## 2024-10-31 RX ORDER — METHYLERGONOVINE MALEATE 0.2 MG/ML
0.2 INJECTION INTRAVENOUS ONCE AS NEEDED
Status: DISCONTINUED | OUTPATIENT
Start: 2024-10-31 | End: 2024-11-01 | Stop reason: HOSPADM

## 2024-10-31 RX ORDER — LOPERAMIDE HYDROCHLORIDE 2 MG/1
4 CAPSULE ORAL EVERY 2 HOUR PRN
Status: DISCONTINUED | OUTPATIENT
Start: 2024-10-31 | End: 2024-11-01 | Stop reason: HOSPADM

## 2024-10-31 RX ORDER — IBUPROFEN 600 MG/1
600 TABLET ORAL EVERY 6 HOURS
Status: DISCONTINUED | OUTPATIENT
Start: 2024-10-31 | End: 2024-11-01 | Stop reason: HOSPADM

## 2024-10-31 RX ORDER — SIMETHICONE 80 MG
80 TABLET,CHEWABLE ORAL 4 TIMES DAILY PRN
Status: DISCONTINUED | OUTPATIENT
Start: 2024-10-31 | End: 2024-11-01 | Stop reason: HOSPADM

## 2024-10-31 RX ORDER — BISACODYL 10 MG/1
10 SUPPOSITORY RECTAL DAILY PRN
Status: DISCONTINUED | OUTPATIENT
Start: 2024-10-31 | End: 2024-11-01 | Stop reason: HOSPADM

## 2024-10-31 RX ORDER — HYDRALAZINE HYDROCHLORIDE 20 MG/ML
5 INJECTION INTRAMUSCULAR; INTRAVENOUS ONCE AS NEEDED
Status: DISCONTINUED | OUTPATIENT
Start: 2024-10-31 | End: 2024-11-01 | Stop reason: HOSPADM

## 2024-10-31 RX ORDER — CARBOPROST TROMETHAMINE 250 UG/ML
250 INJECTION, SOLUTION INTRAMUSCULAR ONCE AS NEEDED
Status: DISCONTINUED | OUTPATIENT
Start: 2024-10-31 | End: 2024-11-01 | Stop reason: HOSPADM

## 2024-10-31 RX ORDER — OXYTOCIN 10 [USP'U]/ML
10 INJECTION, SOLUTION INTRAMUSCULAR; INTRAVENOUS ONCE AS NEEDED
Status: DISCONTINUED | OUTPATIENT
Start: 2024-10-31 | End: 2024-11-01 | Stop reason: HOSPADM

## 2024-10-31 RX ORDER — MISOPROSTOL 200 UG/1
800 TABLET ORAL ONCE AS NEEDED
Status: DISCONTINUED | OUTPATIENT
Start: 2024-10-31 | End: 2024-11-01 | Stop reason: HOSPADM

## 2024-10-31 RX ORDER — TRANEXAMIC ACID 100 MG/ML
1000 INJECTION, SOLUTION INTRAVENOUS ONCE AS NEEDED
Status: DISCONTINUED | OUTPATIENT
Start: 2024-10-31 | End: 2024-11-01 | Stop reason: HOSPADM

## 2024-10-31 RX ORDER — ONDANSETRON 4 MG/1
4 TABLET, FILM COATED ORAL EVERY 6 HOURS PRN
Status: DISCONTINUED | OUTPATIENT
Start: 2024-10-31 | End: 2024-11-01 | Stop reason: HOSPADM

## 2024-10-31 RX ORDER — OXYTOCIN/0.9 % SODIUM CHLORIDE 30/500 ML
60 PLASTIC BAG, INJECTION (ML) INTRAVENOUS ONCE AS NEEDED
Status: DISCONTINUED | OUTPATIENT
Start: 2024-10-31 | End: 2024-11-01 | Stop reason: HOSPADM

## 2024-10-31 RX ORDER — ACETAMINOPHEN 325 MG/1
975 TABLET ORAL EVERY 6 HOURS
Status: DISCONTINUED | OUTPATIENT
Start: 2024-10-31 | End: 2024-11-01 | Stop reason: HOSPADM

## 2024-10-31 RX ORDER — ADHESIVE BANDAGE
10 BANDAGE TOPICAL
Status: DISCONTINUED | OUTPATIENT
Start: 2024-10-31 | End: 2024-11-01 | Stop reason: HOSPADM

## 2024-10-31 RX ORDER — DIPHENHYDRAMINE HYDROCHLORIDE 50 MG/ML
25 INJECTION INTRAMUSCULAR; INTRAVENOUS EVERY 6 HOURS PRN
Status: DISCONTINUED | OUTPATIENT
Start: 2024-10-31 | End: 2024-11-01 | Stop reason: HOSPADM

## 2024-10-31 RX ORDER — NIFEDIPINE 10 MG/1
10 CAPSULE ORAL ONCE AS NEEDED
Status: DISCONTINUED | OUTPATIENT
Start: 2024-10-31 | End: 2024-11-01 | Stop reason: HOSPADM

## 2024-10-31 RX ORDER — LABETALOL HYDROCHLORIDE 5 MG/ML
20 INJECTION, SOLUTION INTRAVENOUS ONCE AS NEEDED
Status: DISCONTINUED | OUTPATIENT
Start: 2024-10-31 | End: 2024-11-01 | Stop reason: HOSPADM

## 2024-10-31 RX ORDER — ONDANSETRON HYDROCHLORIDE 2 MG/ML
4 INJECTION, SOLUTION INTRAVENOUS EVERY 6 HOURS PRN
Status: DISCONTINUED | OUTPATIENT
Start: 2024-10-31 | End: 2024-11-01 | Stop reason: HOSPADM

## 2024-10-31 ASSESSMENT — PAIN SCALES - GENERAL
PAINLEVEL_OUTOF10: 0 - NO PAIN
PAINLEVEL_OUTOF10: 0 - NO PAIN
PAINLEVEL_OUTOF10: 5 - MODERATE PAIN
PAINLEVEL_OUTOF10: 0 - NO PAIN
PAINLEVEL_OUTOF10: 1
PAINLEVEL_OUTOF10: 3
PAINLEVEL_OUTOF10: 0 - NO PAIN
PAINLEVEL_OUTOF10: 0 - NO PAIN
PAINLEVEL_OUTOF10: 2
PAINLEVEL_OUTOF10: 0 - NO PAIN
PAINLEVEL_OUTOF10: 2
PAINLEVEL_OUTOF10: 0 - NO PAIN
PAINLEVEL_OUTOF10: 3
PAINLEVEL_OUTOF10: 0 - NO PAIN
PAINLEVEL_OUTOF10: 3
PAINLEVEL_OUTOF10: 0 - NO PAIN
PAINLEVEL_OUTOF10: 0 - NO PAIN

## 2024-10-31 ASSESSMENT — PAIN DESCRIPTION - LOCATION
LOCATION: ABDOMEN
LOCATION: ABDOMEN

## 2024-10-31 ASSESSMENT — PAIN DESCRIPTION - ORIENTATION: ORIENTATION: LOWER

## 2024-10-31 ASSESSMENT — PAIN DESCRIPTION - DESCRIPTORS
DESCRIPTORS: DISCOMFORT;SORE
DESCRIPTORS: SORE

## 2024-11-01 ENCOUNTER — PHARMACY VISIT (OUTPATIENT)
Dept: PHARMACY | Facility: CLINIC | Age: 39
End: 2024-11-01
Payer: COMMERCIAL

## 2024-11-01 VITALS
SYSTOLIC BLOOD PRESSURE: 121 MMHG | WEIGHT: 183.64 LBS | DIASTOLIC BLOOD PRESSURE: 74 MMHG | HEART RATE: 59 BPM | OXYGEN SATURATION: 97 % | TEMPERATURE: 98.8 F | BODY MASS INDEX: 28.82 KG/M2 | HEIGHT: 67 IN | RESPIRATION RATE: 16 BRPM

## 2024-11-01 LAB — FETAL MATERNAL SCREEN: NORMAL

## 2024-11-01 PROCEDURE — 85461 HEMOGLOBIN FETAL: CPT

## 2024-11-01 PROCEDURE — RXMED WILLOW AMBULATORY MEDICATION CHARGE

## 2024-11-01 PROCEDURE — 2500000001 HC RX 250 WO HCPCS SELF ADMINISTERED DRUGS (ALT 637 FOR MEDICARE OP)

## 2024-11-01 RX ORDER — POLYETHYLENE GLYCOL 3350 17 G/17G
17 POWDER, FOR SOLUTION ORAL 2 TIMES DAILY
Qty: 20 PACKET | Refills: 0 | Status: SHIPPED | OUTPATIENT
Start: 2024-11-01 | End: 2024-11-11

## 2024-11-01 RX ORDER — ACETAMINOPHEN 325 MG/1
975 TABLET ORAL EVERY 6 HOURS PRN
Qty: 120 TABLET | Refills: 0 | Status: SHIPPED | OUTPATIENT
Start: 2024-11-01

## 2024-11-01 RX ORDER — IBUPROFEN 600 MG/1
600 TABLET ORAL EVERY 6 HOURS
Qty: 40 TABLET | Refills: 0 | Status: SHIPPED | OUTPATIENT
Start: 2024-11-01 | End: 2024-11-11

## 2024-11-01 ASSESSMENT — PAIN SCALES - GENERAL
PAIN_LEVEL: 0
PAINLEVEL_OUTOF10: 2

## 2024-11-01 ASSESSMENT — PAIN DESCRIPTION - DESCRIPTORS: DESCRIPTORS: CRAMPING

## 2024-11-01 ASSESSMENT — PAIN DESCRIPTION - LOCATION: LOCATION: ABDOMEN

## 2024-11-04 ENCOUNTER — APPOINTMENT (OUTPATIENT)
Dept: OBSTETRICS AND GYNECOLOGY | Facility: CLINIC | Age: 39
End: 2024-11-04

## 2024-12-13 ENCOUNTER — APPOINTMENT (OUTPATIENT)
Dept: OBSTETRICS AND GYNECOLOGY | Facility: CLINIC | Age: 39
End: 2024-12-13
Payer: COMMERCIAL

## 2024-12-13 VITALS
DIASTOLIC BLOOD PRESSURE: 89 MMHG | HEIGHT: 67 IN | WEIGHT: 160.9 LBS | SYSTOLIC BLOOD PRESSURE: 132 MMHG | BODY MASS INDEX: 25.25 KG/M2

## 2024-12-13 PROCEDURE — 0503F POSTPARTUM CARE VISIT: CPT | Performed by: OBSTETRICS & GYNECOLOGY

## 2024-12-13 RX ORDER — NORGESTIMATE AND ETHINYL ESTRADIOL 7DAYSX3 28
1 KIT ORAL DAILY
Qty: 28 TABLET | Refills: 11 | Status: SHIPPED | OUTPATIENT
Start: 2024-12-13 | End: 2025-12-13

## 2024-12-13 ASSESSMENT — EDINBURGH POSTNATAL DEPRESSION SCALE (EPDS)
I HAVE BEEN ABLE TO LAUGH AND SEE THE FUNNY SIDE OF THINGS: AS MUCH AS I ALWAYS COULD
I HAVE BEEN SO UNHAPPY THAT I HAVE HAD DIFFICULTY SLEEPING: NOT AT ALL
I HAVE FELT SAD OR MISERABLE: NO, NOT AT ALL
THE THOUGHT OF HARMING MYSELF HAS OCCURRED TO ME: NEVER
I HAVE BEEN SO UNHAPPY THAT I HAVE BEEN CRYING: NO, NEVER
THINGS HAVE BEEN GETTING ON TOP OF ME: NO, MOST OF THE TIME I HAVE COPED QUITE WELL
TOTAL SCORE: 2
I HAVE LOOKED FORWARD WITH ENJOYMENT TO THINGS: AS MUCH AS I EVER DID
I HAVE BLAMED MYSELF UNNECESSARILY WHEN THINGS WENT WRONG: NO, NEVER
I HAVE FELT SCARED OR PANICKY FOR NO GOOD REASON: NO, NOT MUCH
I HAVE BEEN ANXIOUS OR WORRIED FOR NO GOOD REASON: NO, NOT AT ALL

## 2024-12-13 NOTE — PROGRESS NOTES
Subjective   Patient ID: Lilly Arshad is a 39 y.o. female who presents for Postpartum Follow-up (Patient her for ppv--10/31/2024/Pt has no c/o--wants to discuss bc options--pt is not breastfeeding/Declined chaperone).  HPI  Patient presents for postpartum check.    Patient is status post spontaneous vaginal delivery on 2024 pregnancy was complicated by embryo transfer.  She delivered a viable female 6 pounds initially breast-fed now bottlefeeding.  She admits to regular bowel movements denies any urinary symptoms.  Denies any postpartum depression Flippin score of 2.  He is very interested in birth control and would like to start birth control pills.  Risk benefits and alternatives explained and she agreed.  Last normal Pap and HPV 2022  Review of Systems    Objective   Physical Exam  Gen.: Alert and in no acute distress. Well-developed, well-nourished.  Thyroid: Nonenlarged and no palpable thyroid nodules  Cardiovascular: Heart regular rate and rhythm  Pulmonary: Clear bilateral breath sounds  Breasts: Deferred abdomen: Soft and nontender, no abdominal mass palpated, no organomegaly   Pelvic: External genitalia normal, Bartholin's urethral and Kirk's glands normal. Vagina normal. Cervix normal. Uterus normal size and shape. Right adnexa normal without masses. Left adnexa normal without masses. Perianal area and normal.  Assessment/Plan   Normal postpartum check, begin Tri-Sprintec, follow-up in 1 year or as needed.         Sumit Crowley MD 24 1:26 PM    Lazy S Complex Repair Preamble Text (Leave Blank If You Do Not Want): Extensive wide undermining was performed.

## 2025-03-04 ENCOUNTER — TELEMEDICINE (OUTPATIENT)
Dept: PRIMARY CARE | Facility: CLINIC | Age: 40
End: 2025-03-04
Payer: COMMERCIAL

## 2025-03-04 DIAGNOSIS — J32.9 BACTERIAL SINUSITIS: Primary | ICD-10-CM

## 2025-03-04 DIAGNOSIS — B96.89 BACTERIAL SINUSITIS: Primary | ICD-10-CM

## 2025-03-04 PROCEDURE — 99214 OFFICE O/P EST MOD 30 MIN: CPT | Performed by: NURSE PRACTITIONER

## 2025-03-04 RX ORDER — AZITHROMYCIN 250 MG/1
TABLET, FILM COATED ORAL
Qty: 6 TABLET | Refills: 0 | Status: SHIPPED | OUTPATIENT
Start: 2025-03-04 | End: 2025-03-09

## 2025-03-04 ASSESSMENT — LIFESTYLE VARIABLES: HISTORY_OF_SMOKING: I SMOKED IN THE PAST, BUT QUIT

## 2025-03-04 ASSESSMENT — ENCOUNTER SYMPTOMS
HEADACHES: 1
SINUS PRESSURE: 1

## 2025-03-04 NOTE — PROGRESS NOTES
Subjective   Patient ID: Lilly Arshad is a 39 y.o. female who presents for a Virtual Visit Sinusitis.    Virtual or Telephone Consent    An interactive audio and video telecommunication system which permits real time communications between the patient (at the originating site) and provider (at the distant site) was utilized to provide this telehealth service.   Verbal consent was requested and obtained from Lilly Arshad on this date, 03/04/25 for a telehealth visit and the patient's location was confirmed at the time of the visit.    Sinusitis  This is a new problem. The current episode started 1 to 4 weeks ago. The problem has been gradually worsening since onset. There has been no fever. The pain is mild. Associated symptoms include headaches and sinus pressure. (2/14/2025 Presented to  with c/o cold symptoms, child and MIL with strep, tested negative  2/21/2025 presented to  still with sore throat, cold symptoms now with oink eye. Strep negative  Today reports continues with sinus congestion, PND, sinus headache, overnight body aches with new jaw discomfort. Denies sore throat or fever, no nausea/vomit/diarrhea, no pink eye) Past treatments include acetaminophen (Advil). The treatment provided mild relief.       Review of Systems   HENT:  Positive for sinus pressure.    Neurological:  Positive for headaches.       Physical Exam not performed  E-visit questionnaire reviewed and discussed with patient.   All questions answered    Assessment/Plan   Problem List Items Addressed This Visit             ICD-10-CM    Bacterial sinusitis - Primary J32.9, B96.89    Relevant Medications    azithromycin (Zithromax) 250 mg tablet     Discussed with patient   Verbalized understanding, Teach back utilized  Recommend follow up with PCP if new or worsening symptoms

## 2025-03-04 NOTE — PATIENT INSTRUCTIONS
A prescription was sent to your pharmacy. Your pharmacist can answer any questions or concerns you may have     Recommend follow up with PCP if new or worsening symptoms    Treatment  Tylenol for aches and pains, fever  Children under 3 months should not take Tylenol, under 6 months should not take Ibuprofen or if dehydrated  Warm salt water gargles for throat discomfort  Lots of Fluids such as tea with honey, soup, broth, water, Electrolyte replacement drinks  Rest      Preventing Infection    Wash your hands. Wash your hands thoroughly and often with soap and water for at least 20 seconds. If soap and water aren't available, use an alcohol-based hand  that contains at least 60% alcohol. Teach your children the importance of hand-washing. Avoid touching your eyes, nose or mouth with unwashed hands.    Disinfect your stuff. Clean and disinfect high-touch surfaces, such as doorknobs, light switches, electronics, and kitchen and bathroom countertops daily. This is especially important when someone in your family has a cold. Wash children's toys periodically.    Cover your cough. Sneeze and cough into tissues. Throw away used tissues right away, then wash your hands thoroughly. If you don't have a tissue, sneeze or cough into the bend of your elbow and then wash your hands.    Don't share. Don't share drinking glasses or eating utensils with other family members. Use your own glass or disposable cups when you or someone else is sick. Label the cup or glass with the name of the person using it.    Stay away from people with colds. Avoid close contact with anyone who has a cold. Stay out of crowds, when possible. Avoid touching your eyes, nose and mouth.  Review your  center's policies. Look for a  setting with good hygiene practices and clear policies about keeping sick children at home.    Take care of yourself. Eating well and getting exercise and enough sleep is good for your overall  health.